# Patient Record
Sex: FEMALE | Race: WHITE | NOT HISPANIC OR LATINO | Employment: UNEMPLOYED | URBAN - METROPOLITAN AREA
[De-identification: names, ages, dates, MRNs, and addresses within clinical notes are randomized per-mention and may not be internally consistent; named-entity substitution may affect disease eponyms.]

---
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
## 2021-11-23 NOTE — ED NOTES
ABD soft and non-distended, RLQ tenderness with rebound tenderness noted.     Efren Zacarias RN  01/04/20 3058     Pt comes in with complaints of sore throat since last night, endorses feeling like it's \"razor blades\" when she swallows.

## 2021-12-06 ENCOUNTER — TELEPHONE (OUTPATIENT)
Dept: SURGERY | Facility: CLINIC | Age: 21
End: 2021-12-06

## 2021-12-06 NOTE — TELEPHONE ENCOUNTER
Caller: MATTHEW SUAREZ    Relationship to patient: SELF    Best call back number: [phone]    Patient is needing: PATIENT IS BEING REFERRED FOR BREAST DX. SHE STATES SHE IS IN A LOT OF PAIN AND WILL HAVE REFERRING PROVIDER FAX OVER HER LAST PROGRESS NOTE.

## 2021-12-06 NOTE — TELEPHONE ENCOUNTER
I called pt and she has scheduled an appt with Dr Billingsley for Mon 12/13/21.  Pt has had NO imaging but stated she will be calling her PCP today to see if she can get some imaging prior to appt.  Pt informed to keep us posted on this so we know where we need to get results from.  Pt verb she would do so.

## 2021-12-09 NOTE — PROGRESS NOTES
SURGERY  Sepideh Cook   2000  12/15/21    Chief Complaint:  Breast mass    HPI    Ms. Cook is a nice 21 y.o. female who is referred by SANTHOSH Sarmiento/Juhi Nuñez, with a right breast mass, 12:00.  This was found at  when she presented due to the brother having tested + for COVID and she having fever 103, vomiting, myalgias starting 12/1.  Her COVID test was negative 12/3.   She was started on keflex.    US Friday showing a fluid collection, 6 mm, consistent with possibly an abscess.  She believes that the area that has decreased as far as swelling pain and redness, but says the central knot is still about the same.  The patient actually thinks it may have gotten larger since her ultrasound.  She does not smoke and has not had any nipple piercings.    She is accompanied by her adoptive mother and father.  They are all understandably very anxious about the idea of a breast mass and/or aspiration.  She has some pretty significant anxiety issues it appears, and significant tachycardia associated with sepsis and thus they are very concerned about the potential for her needing any sort of intervention.  The patient's pain is not unbearable so we do not have to proceed with anything at this time.  The ultrasound in fact suggest a follow-up imaging in 6 weeks.    She is not had any fever other than that associated with her vomiting remotely, and nothing to suggest sepsis.    There is considerable confusion about her medications today and have asked that that they simply get that cleared up for our records.    Past Medical History:   Diagnosis Date   • Acid reflux    • Asthma    • Headache      Past Surgical History:   Procedure Laterality Date   • COLONOSCOPY N/A 06/19/2021   • WISDOM TOOTH EXTRACTION       Family History   Family history unknown: Yes     Social History     Socioeconomic History   • Marital status: Single   Tobacco Use   • Smoking status: Never Smoker   • Smokeless tobacco: Never Used   •  "Tobacco comment: Some passive smoke exposure. Denies vaping.   Vaping Use   • Vaping Use: Never used   Substance and Sexual Activity   • Alcohol use: Never   • Drug use: Never   • Sexual activity: Never         Current Outpatient Medications:   •  albuterol (PROVENTIL) (2.5 MG/3ML) 0.083% nebulizer solution, Take 2.5 mg by nebulization Every 4 (Four) Hours As Needed for Wheezing., Disp: , Rfl:   •  ARIPiprazole (ABILIFY) 2 MG tablet, Take 2 mg by mouth Daily., Disp: , Rfl:   •  cyclobenzaprine (FLEXERIL) 5 MG tablet, Take 5 mg by mouth 2 (Two) Times a Day As Needed., Disp: , Rfl:   •  digoxin (LANOXIN) 250 MCG tablet, Take 250 mcg by mouth Daily., Disp: , Rfl:   •  esomeprazole (nexIUM) 40 MG capsule, Take 1 capsule by mouth Daily., Disp: , Rfl:   •  fludrocortisone 0.1 MG tablet, Take 1 tablet by mouth Daily., Disp: , Rfl:   •  levonorgestrel-ethinyl estradiol (AVIANE,ALESSE,LESSINA) 0.1-20 MG-MCG per tablet, Take 1 tablet by mouth Daily., Disp: , Rfl:   •  LORazepam (ATIVAN) 0.5 MG tablet, Take 0.5 mg by mouth 2 (Two) Times a Day., Disp: , Rfl:   •  midodrine (PROAMATINE) 10 MG tablet, Take 10 mg by mouth 3 (Three) Times a Day., Disp: , Rfl:   •  promethazine (PHENERGAN) 25 MG tablet, Take 25 mg by mouth Every 6 (Six) Hours As Needed., Disp: , Rfl:   •  propranolol (INDERAL) 10 MG tablet, Take 1 tablet by mouth Daily., Disp: , Rfl:   •  Retin-A 0.025 % cream, Apply 1 application topically to the appropriate area as directed Every Night., Disp: , Rfl:   •  topiramate (TOPAMAX) 25 MG tablet, Take 50 mg by mouth Daily., Disp: , Rfl:     Allergies   Allergen Reactions   • Ivabradine Other (See Comments)     Nausea, vomitting. Weakness, breathing problems       Review of Systems  No fever, bloody discharge.  All other systems reviewed.   Vitals:    12/13/21 1518   Weight: 54.4 kg (120 lb)   Height: 154.9 cm (61\")       PHYSICAL EXAM:    Ht 154.9 cm (61\")   Wt 54.4 kg (120 lb)   LMP 11/17/2021   BMI 22.67 kg/m² "   Body mass index is 22.67 kg/m².    Constitutional: well developed, well nourished, appears stated age, healthy  Eyes: sclera nonicteric, conjunctiva not injected   ENMT: Hearing intact, trachea midline, dentitia not seen  CVS: RRR, no murmur, peripheral edema not present  Respiratory: CTA, normal respiratory effort   Gastrointestinal: no hepatosplenomegaly, abdomen soft  Musculoskeletal: gait normal, muscle mass normal  Skin: warm and dry, no rashes visible.  Of good integrity  Neurological: awake and alert, seems to have reasonable capacity for understanding for medical decision making  Psychiatric: appears to have reasonable judgement   Lymphatics: no cervical adenopathy or axillary adenopathy.  Breasts: Right breast in the subareolar region with about a 1 cm mass palpable, no discharge at present (tiny amount of clear discharge previously), not particularly firm.  Left breast without any palpable masses    Radiographic Findings: Possible abscess    IMPRESSION:  · Subareolar abscess versus complex cyst.  I cannot review the films since they were done at [facility].  Albeit she has improved on Keflex  · No family history none with the patient being adopted  · Severe anxiety with multiple medications  · Severe tachycardia when anxiety is heightened making intervention with any sort of aspiration more difficult  · Multiple meds which makes me concerned about any additional medication administration as I normally would do for someone with anxiety for any intervention    PLAN:  · Course of clindamycin.  This may help hasten the resolution of this fluid collection and hopefully make it where we have to do no intervention  · ultrasound of breast in 6 weeks, right limited.  I wrote for this at the same facility since I think it would be easier for comparison, and she may be more familiar with the setting  · Should intervention be necessary, will recommend that they get a single dose or 2 from Dr. Nuñez for administration  the morning of the procedure.  Informed the family that I will send her a note today so she will be aware of the issues.    Ivelisse Billingsley MD  12/15/21  4:30 PM

## 2021-12-13 ENCOUNTER — OFFICE VISIT (OUTPATIENT)
Dept: SURGERY | Facility: CLINIC | Age: 21
End: 2021-12-13

## 2021-12-13 VITALS — HEIGHT: 61 IN | BODY MASS INDEX: 22.66 KG/M2 | WEIGHT: 120 LBS

## 2021-12-13 DIAGNOSIS — N63.10 BREAST MASS, RIGHT: Primary | ICD-10-CM

## 2021-12-13 PROCEDURE — 99203 OFFICE O/P NEW LOW 30 MIN: CPT | Performed by: SURGERY

## 2021-12-13 RX ORDER — TRETINOIN 0.025 %
1 CREAM (GRAM) TOPICAL NIGHTLY
COMMUNITY
Start: 2021-10-25

## 2021-12-16 RX ORDER — CLINDAMYCIN HYDROCHLORIDE 150 MG/1
150 CAPSULE ORAL 3 TIMES DAILY
Qty: 21 CAPSULE | Refills: 0 | Status: SHIPPED | OUTPATIENT
Start: 2021-12-16 | End: 2021-12-23

## 2021-12-28 RX ORDER — CLINDAMYCIN HYDROCHLORIDE 150 MG/1
300 CAPSULE ORAL 4 TIMES DAILY
Qty: 80 CAPSULE | Refills: 0 | Status: SHIPPED | OUTPATIENT
Start: 2021-12-28 | End: 2022-01-07

## 2022-01-13 DIAGNOSIS — N63.10 BREAST MASS, RIGHT: Primary | ICD-10-CM

## 2022-01-17 ENCOUNTER — TELEPHONE (OUTPATIENT)
Dept: SURGERY | Facility: CLINIC | Age: 22
End: 2022-01-17

## 2022-01-17 NOTE — TELEPHONE ENCOUNTER
Left voicemail advising pt of appt for ultrasound guided breast aspiration on 2/4/22 at 10:30 am at [facility] pt to arrive at 9:30 am  NPO after MN pt will need a  and no Asprin containing products 5 days prior. Left my number for pt to call me with any questions.

## 2022-02-04 ENCOUNTER — HOSPITAL ENCOUNTER (OUTPATIENT)
Dept: ULTRASOUND IMAGING | Facility: HOSPITAL | Age: 22
Discharge: HOME | End: 2022-02-04
Admitting: SURGERY

## 2022-02-04 VITALS
TEMPERATURE: 98.4 F | WEIGHT: 125 LBS | DIASTOLIC BLOOD PRESSURE: 85 MMHG | RESPIRATION RATE: 18 BRPM | HEIGHT: 62 IN | SYSTOLIC BLOOD PRESSURE: 141 MMHG | HEART RATE: 117 BPM | BODY MASS INDEX: 23 KG/M2 | OXYGEN SATURATION: 99 %

## 2022-02-04 DIAGNOSIS — N63.10 BREAST MASS, RIGHT: ICD-10-CM

## 2022-02-04 PROCEDURE — 76942 ECHO GUIDE FOR BIOPSY: CPT

## 2022-02-04 NOTE — NURSING NOTE
VSS. Denies pain at this time. Medical/surgical history and medications reviewed. No distress noted. Procedural education completed with patient's questions addressed.

## 2022-02-04 NOTE — NURSING NOTE
Procedure not performed. Area unable to be  visualized on ultrasound. Patient ambulatory to entrance A.

## 2022-02-28 ENCOUNTER — PATIENT ROUNDING (BHMG ONLY) (OUTPATIENT)
Dept: OBSTETRICS AND GYNECOLOGY | Facility: CLINIC | Age: 22
End: 2022-02-28

## 2022-02-28 ENCOUNTER — OFFICE VISIT (OUTPATIENT)
Dept: OBSTETRICS AND GYNECOLOGY | Facility: CLINIC | Age: 22
End: 2022-02-28

## 2022-02-28 VITALS
DIASTOLIC BLOOD PRESSURE: 80 MMHG | SYSTOLIC BLOOD PRESSURE: 122 MMHG | BODY MASS INDEX: 21.97 KG/M2 | HEIGHT: 63 IN | WEIGHT: 124 LBS

## 2022-02-28 DIAGNOSIS — N63.0 BREAST MASS IN FEMALE: ICD-10-CM

## 2022-02-28 DIAGNOSIS — G43.809 OTHER MIGRAINE WITHOUT STATUS MIGRAINOSUS, NOT INTRACTABLE: ICD-10-CM

## 2022-02-28 DIAGNOSIS — Z11.3 SCREEN FOR STD (SEXUALLY TRANSMITTED DISEASE): ICD-10-CM

## 2022-02-28 DIAGNOSIS — Z13.89 SCREENING FOR GENITOURINARY CONDITION: Primary | ICD-10-CM

## 2022-02-28 DIAGNOSIS — N92.0 MENORRHAGIA WITH REGULAR CYCLE: ICD-10-CM

## 2022-02-28 LAB
B-HCG UR QL: NEGATIVE
BILIRUB BLD-MCNC: NEGATIVE MG/DL
CLARITY, POC: CLEAR
COLOR UR: YELLOW
EXPIRATION DATE: NORMAL
GLUCOSE UR STRIP-MCNC: NEGATIVE MG/DL
INTERNAL NEGATIVE CONTROL: NEGATIVE
INTERNAL POSITIVE CONTROL: POSITIVE
KETONES UR QL: NEGATIVE
LEUKOCYTE EST, POC: NEGATIVE
Lab: NORMAL
NITRITE UR-MCNC: NEGATIVE MG/ML
PH UR: 5 [PH] (ref 5–8)
PROT UR STRIP-MCNC: NEGATIVE MG/DL
RBC # UR STRIP: NEGATIVE /UL
SP GR UR: 1.02 (ref 1–1.03)
UROBILINOGEN UR QL: NORMAL

## 2022-02-28 PROCEDURE — 99204 OFFICE O/P NEW MOD 45 MIN: CPT | Performed by: OBSTETRICS & GYNECOLOGY

## 2022-02-28 PROCEDURE — 81025 URINE PREGNANCY TEST: CPT | Performed by: OBSTETRICS & GYNECOLOGY

## 2022-02-28 RX ORDER — OMEPRAZOLE AND SODIUM BICARBONATE 40; 1100 MG/1; MG/1
1 CAPSULE ORAL
COMMUNITY
Start: 2022-01-20 | End: 2022-09-08

## 2022-02-28 RX ORDER — LORATADINE 10 MG/1
10 TABLET ORAL DAILY PRN
Status: ON HOLD | COMMUNITY
Start: 2022-01-10 | End: 2022-09-16

## 2022-02-28 RX ORDER — TOPIRAMATE 25 MG/1
50 TABLET ORAL
COMMUNITY
Start: 2022-02-14 | End: 2022-03-30 | Stop reason: SDUPTHER

## 2022-02-28 RX ORDER — SUCRALFATE 1 G/1
1 TABLET ORAL 4 TIMES DAILY
COMMUNITY
Start: 2022-02-15 | End: 2022-09-08

## 2022-02-28 RX ORDER — PROPRANOLOL HYDROCHLORIDE 10 MG/1
10 TABLET ORAL 3 TIMES DAILY
COMMUNITY
Start: 2022-02-14 | End: 2022-09-08

## 2022-02-28 NOTE — PROGRESS NOTES
A MY-CHART MESSAGE HAS BEEN SENT TO THE PATIENT FOR PATIENT ROUNDING WITH [facility]

## 2022-03-01 LAB
BASOPHILS # BLD AUTO: 0.1 X10E3/UL (ref 0–0.2)
BASOPHILS NFR BLD AUTO: 1 %
EOSINOPHIL # BLD AUTO: 0.1 X10E3/UL (ref 0–0.4)
EOSINOPHIL NFR BLD AUTO: 3 %
ERYTHROCYTE [DISTWIDTH] IN BLOOD BY AUTOMATED COUNT: 12 % (ref 11.7–15.4)
FERRITIN SERPL-MCNC: 44 NG/ML (ref 15–150)
HBV SURFACE AG SERPL QL IA: NEGATIVE
HCT VFR BLD AUTO: 42.6 % (ref 34–46.6)
HCV AB S/CO SERPL IA: 0.2 S/CO RATIO (ref 0–0.9)
HGB BLD-MCNC: 14.6 G/DL (ref 11.1–15.9)
HIV 1+2 AB+HIV1 P24 AG SERPL QL IA: NON REACTIVE
HSV1 IGG SER IA-ACNC: 47.1 INDEX (ref 0–0.9)
HSV2 IGG SER IA-ACNC: <0.91 INDEX (ref 0–0.9)
IMM GRANULOCYTES # BLD AUTO: 0 X10E3/UL (ref 0–0.1)
IMM GRANULOCYTES NFR BLD AUTO: 0 %
LYMPHOCYTES # BLD AUTO: 1.9 X10E3/UL (ref 0.7–3.1)
LYMPHOCYTES NFR BLD AUTO: 38 %
MCH RBC QN AUTO: 31.1 PG (ref 26.6–33)
MCHC RBC AUTO-ENTMCNC: 34.3 G/DL (ref 31.5–35.7)
MCV RBC AUTO: 91 FL (ref 79–97)
MONOCYTES # BLD AUTO: 0.4 X10E3/UL (ref 0.1–0.9)
MONOCYTES NFR BLD AUTO: 8 %
NEUTROPHILS # BLD AUTO: 2.5 X10E3/UL (ref 1.4–7)
NEUTROPHILS NFR BLD AUTO: 50 %
PLATELET # BLD AUTO: 266 X10E3/UL (ref 150–450)
RBC # BLD AUTO: 4.7 X10E6/UL (ref 3.77–5.28)
RPR SER QL: NON REACTIVE
TSH SERPL DL<=0.005 MIU/L-ACNC: 2.11 UIU/ML (ref 0.45–4.5)
WBC # BLD AUTO: 5 X10E3/UL (ref 3.4–10.8)

## 2022-03-01 NOTE — PROGRESS NOTES
PIP= normal blood count, iron level and thyroid. 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

## 2022-03-02 LAB
C TRACH RRNA SPEC QL NAA+PROBE: NEGATIVE
N GONORRHOEA RRNA SPEC QL NAA+PROBE: NEGATIVE
T VAGINALIS DNA SPEC QL NAA+PROBE: NEGATIVE

## 2022-03-10 ENCOUNTER — HOSPITAL ENCOUNTER (OUTPATIENT)
Dept: ULTRASOUND IMAGING | Facility: HOSPITAL | Age: 22
Discharge: HOME | End: 2022-03-10
Admitting: OBSTETRICS & GYNECOLOGY

## 2022-03-10 DIAGNOSIS — N63.0 BREAST MASS IN FEMALE: ICD-10-CM

## 2022-03-10 PROCEDURE — 76642 ULTRASOUND BREAST LIMITED: CPT

## 2022-03-30 ENCOUNTER — OFFICE VISIT (OUTPATIENT)
Dept: OBSTETRICS AND GYNECOLOGY | Facility: CLINIC | Age: 22
End: 2022-03-30

## 2022-03-30 VITALS
WEIGHT: 124 LBS | DIASTOLIC BLOOD PRESSURE: 72 MMHG | BODY MASS INDEX: 22.82 KG/M2 | SYSTOLIC BLOOD PRESSURE: 110 MMHG | HEIGHT: 62 IN

## 2022-03-30 DIAGNOSIS — N94.6 DYSMENORRHEA: ICD-10-CM

## 2022-03-30 DIAGNOSIS — G43.809 OTHER MIGRAINE WITHOUT STATUS MIGRAINOSUS, NOT INTRACTABLE: ICD-10-CM

## 2022-03-30 DIAGNOSIS — N92.0 MENORRHAGIA WITH REGULAR CYCLE: ICD-10-CM

## 2022-03-30 DIAGNOSIS — N63.0 BREAST MASS IN FEMALE: Primary | ICD-10-CM

## 2022-03-30 PROCEDURE — 99214 OFFICE O/P EST MOD 30 MIN: CPT | Performed by: OBSTETRICS & GYNECOLOGY

## 2022-03-30 RX ORDER — ACETAMINOPHEN AND CODEINE PHOSPHATE 120; 12 MG/5ML; MG/5ML
1 SOLUTION ORAL DAILY
Qty: 84 TABLET | Refills: 1 | Status: SHIPPED | OUTPATIENT
Start: 2022-03-30 | End: 2022-07-13 | Stop reason: ALTCHOICE

## 2022-03-30 RX ORDER — SUMATRIPTAN 50 MG/1
50 TABLET, FILM COATED ORAL ONCE AS NEEDED
COMMUNITY
Start: 2022-03-21 | End: 2022-10-20 | Stop reason: SDUPTHER

## 2022-03-30 RX ORDER — TOPIRAMATE 50 MG/1
50 TABLET, FILM COATED ORAL DAILY
Status: ON HOLD | COMMUNITY
Start: 2022-03-21 | End: 2022-09-16

## 2022-07-13 ENCOUNTER — OFFICE VISIT (OUTPATIENT)
Dept: OBSTETRICS AND GYNECOLOGY | Facility: CLINIC | Age: 22
End: 2022-07-13

## 2022-07-13 VITALS
SYSTOLIC BLOOD PRESSURE: 118 MMHG | HEIGHT: 62 IN | DIASTOLIC BLOOD PRESSURE: 74 MMHG | BODY MASS INDEX: 23.66 KG/M2 | WEIGHT: 128.6 LBS

## 2022-07-13 DIAGNOSIS — Z78.9 SELF-CATHETERIZES URINARY BLADDER: ICD-10-CM

## 2022-07-13 DIAGNOSIS — N94.6 DYSMENORRHEA: Primary | ICD-10-CM

## 2022-07-13 PROCEDURE — 99213 OFFICE O/P EST LOW 20 MIN: CPT | Performed by: OBSTETRICS & GYNECOLOGY

## 2022-07-13 RX ORDER — MECLIZINE HYDROCHLORIDE 25 MG/1
25 TABLET ORAL 3 TIMES DAILY PRN
COMMUNITY
Start: 2022-05-10

## 2022-07-13 RX ORDER — CETIRIZINE HYDROCHLORIDE 10 MG/1
TABLET ORAL
COMMUNITY
Start: 2022-07-07 | End: 2022-09-08

## 2022-07-13 RX ORDER — TAMSULOSIN HYDROCHLORIDE 0.4 MG/1
1 CAPSULE ORAL DAILY
COMMUNITY
Start: 2022-05-11 | End: 2023-03-18

## 2022-07-13 RX ORDER — LORAZEPAM 0.5 MG/1
0.5 TABLET ORAL
COMMUNITY
Start: 2022-06-09 | End: 2022-09-08 | Stop reason: SDUPTHER

## 2022-07-13 RX ORDER — FLUTICASONE PROPIONATE 50 MCG
SPRAY, SUSPENSION (ML) NASAL
COMMUNITY
Start: 2022-05-09 | End: 2022-09-08 | Stop reason: SDUPTHER

## 2022-07-13 RX ORDER — HYDROXYZINE HYDROCHLORIDE 25 MG/1
TABLET, FILM COATED ORAL
COMMUNITY
Start: 2022-07-07 | End: 2022-09-08

## 2022-07-13 RX ORDER — PROPRANOLOL HYDROCHLORIDE 10 MG/1
10 TABLET ORAL 3 TIMES DAILY
COMMUNITY
Start: 2022-05-11 | End: 2022-09-08 | Stop reason: SDUPTHER

## 2022-07-13 RX ORDER — PRAZOSIN HYDROCHLORIDE 1 MG/1
1 CAPSULE ORAL DAILY PRN
COMMUNITY
Start: 2022-06-13 | End: 2022-11-28 | Stop reason: ALTCHOICE

## 2022-07-13 RX ORDER — FLUTICASONE PROPIONATE 50 MCG
2 SPRAY, SUSPENSION (ML) NASAL DAILY
COMMUNITY
Start: 2022-04-15 | End: 2022-09-08 | Stop reason: SDUPTHER

## 2022-07-13 RX ORDER — THIAMINE HCL 100 MG
1 TABLET ORAL 3 TIMES DAILY
COMMUNITY
Start: 2022-05-20 | End: 2023-03-18

## 2022-07-13 RX ORDER — TOPIRAMATE 50 MG/1
50 TABLET, FILM COATED ORAL
COMMUNITY
Start: 2022-03-21 | End: 2022-09-08 | Stop reason: SDUPTHER

## 2022-07-13 RX ORDER — PLECANATIDE 3 MG/1
1 TABLET ORAL DAILY
COMMUNITY
Start: 2022-05-20 | End: 2022-09-08

## 2022-07-13 RX ORDER — PRAZOSIN HYDROCHLORIDE 1 MG/1
1 CAPSULE ORAL
COMMUNITY
Start: 2022-06-13 | End: 2022-09-08 | Stop reason: SDUPTHER

## 2022-07-13 RX ORDER — SUCRALFATE 1 G/1
1 TABLET ORAL 4 TIMES DAILY
COMMUNITY
Start: 2022-04-19 | End: 2022-09-08

## 2022-07-13 RX ORDER — OMEPRAZOLE 40 MG/1
40 CAPSULE, DELAYED RELEASE ORAL
COMMUNITY
Start: 2022-04-25 | End: 2022-09-08
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
## 2022-07-26 ENCOUNTER — PREP FOR SURGERY (OUTPATIENT)
Dept: OTHER | Facility: HOSPITAL | Age: 22
End: 2022-07-26

## 2022-07-26 DIAGNOSIS — N94.6 SEVERE DYSMENORRHEA: Primary | ICD-10-CM

## 2022-07-26 DIAGNOSIS — N93.8 DUB (DYSFUNCTIONAL UTERINE BLEEDING): ICD-10-CM

## 2022-07-26 RX ORDER — SODIUM CHLORIDE 9 MG/ML
40 INJECTION, SOLUTION INTRAVENOUS AS NEEDED
Status: CANCELLED | OUTPATIENT
Start: 2022-07-26

## 2022-07-26 RX ORDER — SODIUM CHLORIDE 0.9 % (FLUSH) 0.9 %
10 SYRINGE (ML) INJECTION AS NEEDED
Status: CANCELLED | OUTPATIENT
Start: 2022-07-26

## 2022-07-26 RX ORDER — SODIUM CHLORIDE 0.9 % (FLUSH) 0.9 %
10 SYRINGE (ML) INJECTION EVERY 12 HOURS SCHEDULED
Status: CANCELLED | OUTPATIENT
Start: 2022-07-26

## 2022-08-01 ENCOUNTER — TELEPHONE (OUTPATIENT)
Dept: OBSTETRICS AND GYNECOLOGY | Facility: CLINIC | Age: 22
End: 2022-08-01

## 2022-08-01 NOTE — TELEPHONE ENCOUNTER
Spoke with the patient and she had surgery today and doesn't feel comfortable having surgery tomorrow. I told her I would see what else we could do and get back with her.    Statement Selected

## 2022-08-01 NOTE — TELEPHONE ENCOUNTER
Daron- can we add her on tomorrow? I do not want her to wait if she feels the pills are causing her to feel unstable. She can stop her pills and we will move up her surgery date.   Melany Murray MD

## 2022-08-18 ENCOUNTER — TELEPHONE (OUTPATIENT)
Dept: OBSTETRICS AND GYNECOLOGY | Facility: CLINIC | Age: 22
End: 2022-08-18

## 2022-08-18 NOTE — TELEPHONE ENCOUNTER
Caller: Sepideh Cook    Relationship to patient: Self    Best call back number: [phone]    Patient is needing: PT CALLED TO SCHEDULE AN IUD INSERTION. I WAS UNABLE TO WARM TRANSFER. PT STATED SHE NEEDS A Friday APPT AND WILL GO TO EITHER LOCATION.      PLEASE CALL PT BACK [phone].

## 2022-08-19 PROBLEM — N93.8 DUB (DYSFUNCTIONAL UTERINE BLEEDING): Status: ACTIVE | Noted: 2022-08-19

## 2022-08-19 PROBLEM — N94.6 SEVERE DYSMENORRHEA: Status: ACTIVE | Noted: 2022-08-19

## 2022-09-06 ENCOUNTER — TELEPHONE (OUTPATIENT)
Dept: OBSTETRICS AND GYNECOLOGY | Facility: CLINIC | Age: 22
End: 2022-09-06

## 2022-09-06 NOTE — TELEPHONE ENCOUNTER
Good afternoon! Dr. Murray is going to be performing an exam under anesthesia and Kyleena IUD placement on [facility] 9/16/22. Anesthesia is requesting cardiac clearance. Do you need to see the patient in your office? I faxed a clearance form to your office as well. Thank you for your time!

## 2022-09-08 ENCOUNTER — PRE-ADMISSION TESTING (OUTPATIENT)
Dept: PREADMISSION TESTING | Facility: HOSPITAL | Age: 22
End: 2022-09-08

## 2022-09-08 ENCOUNTER — ANESTHESIA EVENT (OUTPATIENT)
Dept: PERIOP | Facility: HOSPITAL | Age: 22
End: 2022-09-08

## 2022-09-08 VITALS
WEIGHT: 126 LBS | BODY MASS INDEX: 23.19 KG/M2 | DIASTOLIC BLOOD PRESSURE: 72 MMHG | SYSTOLIC BLOOD PRESSURE: 102 MMHG | OXYGEN SATURATION: 96 % | HEIGHT: 62 IN | RESPIRATION RATE: 16 BRPM | HEART RATE: 129 BPM

## 2022-09-08 LAB
ANION GAP SERPL CALCULATED.3IONS-SCNC: 8.3 MMOL/L (ref 5–15)
BUN SERPL-MCNC: 9 MG/DL (ref 6–20)
BUN/CREAT SERPL: 12.3 (ref 7–25)
CALCIUM SPEC-SCNC: 8.7 MG/DL (ref 8.6–10.5)
CHLORIDE SERPL-SCNC: 105 MMOL/L (ref 98–107)
CO2 SERPL-SCNC: 24.7 MMOL/L (ref 22–29)
CREAT SERPL-MCNC: 0.73 MG/DL (ref 0.57–1)
DEPRECATED RDW RBC AUTO: 40.9 FL (ref 37–54)
EGFRCR SERPLBLD CKD-EPI 2021: 120.9 ML/MIN/1.73
ERYTHROCYTE [DISTWIDTH] IN BLOOD BY AUTOMATED COUNT: 11.7 % (ref 12.3–15.4)
GLUCOSE SERPL-MCNC: 128 MG/DL (ref 65–99)
HCT VFR BLD AUTO: 40.3 % (ref 34–46.6)
HGB BLD-MCNC: 13.4 G/DL (ref 12–15.9)
MCH RBC QN AUTO: 30.9 PG (ref 26.6–33)
MCHC RBC AUTO-ENTMCNC: 33.3 G/DL (ref 31.5–35.7)
MCV RBC AUTO: 93.1 FL (ref 79–97)
PLATELET # BLD AUTO: 244 10*3/MM3 (ref 140–450)
PMV BLD AUTO: 9.2 FL (ref 6–12)
POTASSIUM SERPL-SCNC: 3.8 MMOL/L (ref 3.5–5.2)
RBC # BLD AUTO: 4.33 10*6/MM3 (ref 3.77–5.28)
SODIUM SERPL-SCNC: 138 MMOL/L (ref 136–145)
WBC NRBC COR # BLD: 4.91 10*3/MM3 (ref 3.4–10.8)

## 2022-09-08 PROCEDURE — 85027 COMPLETE CBC AUTOMATED: CPT | Performed by: OBSTETRICS & GYNECOLOGY

## 2022-09-08 PROCEDURE — 80048 BASIC METABOLIC PNL TOTAL CA: CPT | Performed by: OBSTETRICS & GYNECOLOGY

## 2022-09-08 PROCEDURE — 36415 COLL VENOUS BLD VENIPUNCTURE: CPT

## 2022-09-08 NOTE — DISCHARGE INSTRUCTIONS
PRE-ADMISSION TESTING INSTRUCTIONS FOR ADULTS    Take these medications the morning of surgery with a small sip of water:  use nebulizer, take multaq, abilify, nexium,, and ativan if needed      Do not take any insulin or diabetes medications the morning of surgery.      No aspirin, advil, aleve, ibuprofen, naproxen, diet pills, decongestants, or herbal/vitamins for a week prior to surgery.   Tylenol/Acetaminophen is okay to take if needed.    General Instructions:    DO NOT EAT SOLID FOOD AFTER MIDNIGHT THE NIGHT BEFORE SURGERY. No gum, mints, or hard candy after midnight the night before surgery.  You may drink clear liquids the day of surgery up until 2 hours before your arrival time. (5:30 am)  Clear liquids are liquids you can see through. Nothing RED in color.    Plain water    Sports drinks  Sodas     Gelatin (Jell-O)  Fruit juices without pulp such as white grape juice and apple juice  Popsicles that contain no fruit or yogurt  Tea or coffee (no cream or milk added)    It is beneficial for you to have a clear drink that contains carbohydrates just before you leave your house and before your fasting time begins.  We suggest a 20 ounce bottle of Gatorade or Powerade for non-diabetic patients or a 20 ounce bottle of G2 or Powerade Zero for diabetic patients.  (5:30 am)    Patients who avoid smoking, chewing tobacco and alcohol for 4 weeks prior to surgery have a reduced risk of post-operative complications.  If at all possible, quit smoking as many days before surgery as you can.    Do not smoke, use chewing tobacco or drink alcohol the day of surgery    Bring your C-PAP/ BI-PAP machine if you use one.  Wear clean comfortable clothes and socks.  Do not wear contact lenses, lotion, deodorant, or make-up.  Bring a case for your glasses if applicable. You may brush your teeth the morning of surgery.  You may wear dentures/partials, do not put adhesive/glue on them.  Leave all other jewelry and valuables at  home.      Preventing a Surgical Site Infection:    Shower the night before and on the morning of surgery using the chlorhexidine soap you were given.  Use a clean washcloth with the soap.  Place clean sheets on your bed after showering the night before surgery. Do not use the CHG soap on your hair, face, or private areas. Wash your body gently for five (5) minutes. Do not scrub your skin.  Dry with a clean towel and dress in clean clothing.  Do not shave the surgical area for 10 days-2 weeks prior to surgery  because the razor can irritate skin and make it easier to develop an infection.  Make sure you, your family, and all healthcare providers clean their hands with soap and water or an alcohol based hand  before caring for you or your wound.      Day of surgery:    Your surgeon’s office will advise you of your arrival time for the day of surgery.    Upon arrival, a Pre-op nurse and Anesthesia provider will review your health history, obtain vital signs, and answer questions you may have.  The only belongings needed at this time will be your home medications and if applicable your C-PAP/BI-PAP machine.  If you are staying overnight your family can leave the rest of your belongings in the car and bring them to your room later.  A Pre-op nurse will start an IV and you may receive medication in preparation for surgery, including something to help you relax.  Your family will be able to see you in the Pre-op area.  While you are in surgery your family should notify the waiting room  if they leave the waiting room area and provide a contact phone number.    IF you have any questions, you can call the Pre-Admission Department at [phone] or your surgeon's office.  Notify your surgeon if  you become sick, have a fever, productive cough, or cannot be here the day of surgery    Please be aware that surgery does come with discomfort.  We want to make every effort to control your discomfort so  please discuss any uncontrolled symptoms with your nurse.   Your doctor will most likely have prescribed pain medications.      If you are going home after surgery, you will receive individualized written care instructions before being discharged.  A responsible adult (over the age of 18) must drive you to and from the hospital on the day of your surgery and stay with you for 24 hours after anesthesia.    If you are staying overnight following surgery, you will be transported to your hospital room following the recovery period.  [facility] has all private rooms.    You may receive a survey regarding the care you received. Your feedback is very important and will be used to collect the necessary data to help us to continue to provide excellent care.     Deductibles and co-payments are collected on the day of service. Please be prepared to pay the required co-pay, deductible or deposit on the day of service as defined by your plan.

## 2022-09-08 NOTE — PAT
Pt here for PAT visit.  Pre-op tests completed, instructed shower w/antibacterial soap, and preop instructions reviewed.  Instructed clears until 5:30 am dos, voiced understanding. Surgoen's office has reached out to cardiology re: clearance for surgery. Will have anesthesia review for any further needs.

## 2022-09-12 ENCOUNTER — TELEPHONE (OUTPATIENT)
Dept: OBSTETRICS AND GYNECOLOGY | Facility: CLINIC | Age: 22
End: 2022-09-12

## 2022-09-12 NOTE — TELEPHONE ENCOUNTER
Caller: Sepideh Cook    Relationship to patient: Self    Best call back number: [phone]    Patient is needing:;LAB RESULTS

## 2022-09-13 ENCOUNTER — TELEPHONE (OUTPATIENT)
Dept: OBSTETRICS AND GYNECOLOGY | Facility: CLINIC | Age: 22
End: 2022-09-13

## 2022-09-13 NOTE — TELEPHONE ENCOUNTER
Patient wanting to know if her PAT labs were ok? She states her Glucose was a fasting glucose. Please advise

## 2022-09-13 NOTE — TELEPHONE ENCOUNTER
128 is high for a fasting sugar. She needs to see her primary care doctor for further workup. This does not change her gyn care at this time. TIFFANY

## 2022-09-15 PROCEDURE — S0260 H&P FOR SURGERY: HCPCS | Performed by: OBSTETRICS & GYNECOLOGY
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
## 2022-09-16 ENCOUNTER — ANESTHESIA (OUTPATIENT)
Dept: PERIOP | Facility: HOSPITAL | Age: 22
End: 2022-09-16

## 2022-09-16 ENCOUNTER — HOSPITAL ENCOUNTER (OUTPATIENT)
Facility: HOSPITAL | Age: 22
Setting detail: HOSPITAL OUTPATIENT SURGERY
Discharge: HOME | End: 2022-09-16
Attending: OBSTETRICS & GYNECOLOGY | Admitting: OBSTETRICS & GYNECOLOGY

## 2022-09-16 VITALS
OXYGEN SATURATION: 97 % | HEART RATE: 85 BPM | DIASTOLIC BLOOD PRESSURE: 72 MMHG | BODY MASS INDEX: 23.34 KG/M2 | SYSTOLIC BLOOD PRESSURE: 105 MMHG | WEIGHT: 127.6 LBS | RESPIRATION RATE: 15 BRPM | TEMPERATURE: 98.1 F

## 2022-09-16 DIAGNOSIS — N93.8 DUB (DYSFUNCTIONAL UTERINE BLEEDING): ICD-10-CM

## 2022-09-16 DIAGNOSIS — N94.6 SEVERE DYSMENORRHEA: ICD-10-CM

## 2022-09-16 DIAGNOSIS — Z30.430 ENCOUNTER FOR IUD INSERTION: Primary | ICD-10-CM

## 2022-09-16 LAB — HCG SERPL QL: NEGATIVE

## 2022-09-16 PROCEDURE — 25010000002 MIDAZOLAM PER 1MG: Performed by: NURSE ANESTHETIST, CERTIFIED REGISTERED

## 2022-09-16 PROCEDURE — 58300 INSERT INTRAUTERINE DEVICE: CPT | Performed by: OBSTETRICS & GYNECOLOGY

## 2022-09-16 PROCEDURE — 25010000002 FENTANYL CITRATE (PF) 100 MCG/2ML SOLUTION: Performed by: REGISTERED NURSE

## 2022-09-16 PROCEDURE — 25010000002 ONDANSETRON PER 1 MG: Performed by: NURSE ANESTHETIST, CERTIFIED REGISTERED

## 2022-09-16 PROCEDURE — C1889 IMPLANT/INSERT DEVICE, NOC: HCPCS | Performed by: OBSTETRICS & GYNECOLOGY

## 2022-09-16 PROCEDURE — 25010000002 DEXAMETHASONE PER 1 MG: Performed by: NURSE ANESTHETIST, CERTIFIED REGISTERED

## 2022-09-16 PROCEDURE — 84703 CHORIONIC GONADOTROPIN ASSAY: CPT | Performed by: NURSE ANESTHETIST, CERTIFIED REGISTERED

## 2022-09-16 PROCEDURE — 25010000002 PROPOFOL 10 MG/ML EMULSION: Performed by: REGISTERED NURSE

## 2022-09-16 DEVICE — IUD LEVONORGESTREL KYLEENA 19.5MG: Type: IMPLANTABLE DEVICE | Site: UTERUS | Status: FUNCTIONAL

## 2022-09-16 RX ORDER — FENTANYL CITRATE 50 UG/ML
25 INJECTION, SOLUTION INTRAMUSCULAR; INTRAVENOUS
Status: DISCONTINUED | OUTPATIENT
Start: 2022-09-16 | End: 2022-09-16 | Stop reason: HOSPADM

## 2022-09-16 RX ORDER — SODIUM CHLORIDE 0.9 % (FLUSH) 0.9 %
10 SYRINGE (ML) INJECTION EVERY 12 HOURS SCHEDULED
Status: DISCONTINUED | OUTPATIENT
Start: 2022-09-16 | End: 2022-09-16 | Stop reason: HOSPADM

## 2022-09-16 RX ORDER — LIDOCAINE HYDROCHLORIDE 20 MG/ML
INJECTION, SOLUTION INFILTRATION; PERINEURAL AS NEEDED
Status: DISCONTINUED | OUTPATIENT
Start: 2022-09-16 | End: 2022-09-16 | Stop reason: SURG

## 2022-09-16 RX ORDER — SODIUM CHLORIDE 9 MG/ML
40 INJECTION, SOLUTION INTRAVENOUS AS NEEDED
Status: DISCONTINUED | OUTPATIENT
Start: 2022-09-16 | End: 2022-09-16 | Stop reason: HOSPADM

## 2022-09-16 RX ORDER — MIDAZOLAM HYDROCHLORIDE 2 MG/2ML
0.5 INJECTION, SOLUTION INTRAMUSCULAR; INTRAVENOUS
Status: DISCONTINUED | OUTPATIENT
Start: 2022-09-16 | End: 2022-09-16 | Stop reason: HOSPADM

## 2022-09-16 RX ORDER — ONDANSETRON 2 MG/ML
4 INJECTION INTRAMUSCULAR; INTRAVENOUS ONCE AS NEEDED
Status: DISCONTINUED | OUTPATIENT
Start: 2022-09-16 | End: 2022-09-16 | Stop reason: HOSPADM

## 2022-09-16 RX ORDER — SODIUM CHLORIDE 0.9 % (FLUSH) 0.9 %
10 SYRINGE (ML) INJECTION AS NEEDED
Status: DISCONTINUED | OUTPATIENT
Start: 2022-09-16 | End: 2022-09-16 | Stop reason: HOSPADM

## 2022-09-16 RX ORDER — ONDANSETRON 2 MG/ML
4 INJECTION INTRAMUSCULAR; INTRAVENOUS ONCE AS NEEDED
Status: COMPLETED | OUTPATIENT
Start: 2022-09-16 | End: 2022-09-16

## 2022-09-16 RX ORDER — IBUPROFEN 600 MG/1
600 TABLET ORAL EVERY 6 HOURS PRN
Qty: 30 TABLET | Refills: 0 | Status: SHIPPED | OUTPATIENT
Start: 2022-09-16 | End: 2023-03-18

## 2022-09-16 RX ORDER — FAMOTIDINE 10 MG/ML
20 INJECTION, SOLUTION INTRAVENOUS
Status: COMPLETED | OUTPATIENT
Start: 2022-09-16 | End: 2022-09-16

## 2022-09-16 RX ORDER — DEXMEDETOMIDINE HYDROCHLORIDE 100 UG/ML
INJECTION, SOLUTION INTRAVENOUS AS NEEDED
Status: DISCONTINUED | OUTPATIENT
Start: 2022-09-16 | End: 2022-09-16 | Stop reason: SURG

## 2022-09-16 RX ORDER — KETAMINE HYDROCHLORIDE 10 MG/ML
INJECTION INTRAMUSCULAR; INTRAVENOUS AS NEEDED
Status: DISCONTINUED | OUTPATIENT
Start: 2022-09-16 | End: 2022-09-16 | Stop reason: SURG

## 2022-09-16 RX ORDER — HYDROCODONE BITARTRATE AND ACETAMINOPHEN 5; 325 MG/1; MG/1
1 TABLET ORAL EVERY 8 HOURS PRN
Qty: 4 TABLET | Refills: 0 | Status: SHIPPED | OUTPATIENT
Start: 2022-09-16 | End: 2022-09-28

## 2022-09-16 RX ORDER — FENTANYL CITRATE 50 UG/ML
INJECTION, SOLUTION INTRAMUSCULAR; INTRAVENOUS AS NEEDED
Status: DISCONTINUED | OUTPATIENT
Start: 2022-09-16 | End: 2022-09-16 | Stop reason: SURG

## 2022-09-16 RX ORDER — DEXAMETHASONE SODIUM PHOSPHATE 4 MG/ML
8 INJECTION, SOLUTION INTRA-ARTICULAR; INTRALESIONAL; INTRAMUSCULAR; INTRAVENOUS; SOFT TISSUE ONCE AS NEEDED
Status: COMPLETED | OUTPATIENT
Start: 2022-09-16 | End: 2022-09-16

## 2022-09-16 RX ORDER — GLYCOPYRROLATE 0.2 MG/ML
INJECTION INTRAMUSCULAR; INTRAVENOUS AS NEEDED
Status: DISCONTINUED | OUTPATIENT
Start: 2022-09-16 | End: 2022-09-16 | Stop reason: SURG

## 2022-09-16 RX ORDER — SODIUM CHLORIDE, SODIUM LACTATE, POTASSIUM CHLORIDE, CALCIUM CHLORIDE 600; 310; 30; 20 MG/100ML; MG/100ML; MG/100ML; MG/100ML
9 INJECTION, SOLUTION INTRAVENOUS CONTINUOUS PRN
Status: DISCONTINUED | OUTPATIENT
Start: 2022-09-16 | End: 2022-09-16 | Stop reason: HOSPADM

## 2022-09-16 RX ORDER — LIDOCAINE HYDROCHLORIDE 10 MG/ML
0.5 INJECTION, SOLUTION EPIDURAL; INFILTRATION; INTRACAUDAL; PERINEURAL ONCE AS NEEDED
Status: DISCONTINUED | OUTPATIENT
Start: 2022-09-16 | End: 2022-09-16 | Stop reason: HOSPADM

## 2022-09-16 RX ORDER — PROPOFOL 10 MG/ML
VIAL (ML) INTRAVENOUS AS NEEDED
Status: DISCONTINUED | OUTPATIENT
Start: 2022-09-16 | End: 2022-09-16 | Stop reason: SURG

## 2022-09-16 RX ORDER — MAGNESIUM HYDROXIDE 1200 MG/15ML
LIQUID ORAL AS NEEDED
Status: DISCONTINUED | OUTPATIENT
Start: 2022-09-16 | End: 2022-09-16 | Stop reason: HOSPADM

## 2022-09-16 RX ORDER — OXYCODONE AND ACETAMINOPHEN 7.5; 325 MG/1; MG/1
1 TABLET ORAL ONCE AS NEEDED
Status: DISCONTINUED | OUTPATIENT
Start: 2022-09-16 | End: 2022-09-16 | Stop reason: HOSPADM

## 2022-09-16 RX ORDER — SODIUM CHLORIDE, SODIUM LACTATE, POTASSIUM CHLORIDE, CALCIUM CHLORIDE 600; 310; 30; 20 MG/100ML; MG/100ML; MG/100ML; MG/100ML
100 INJECTION, SOLUTION INTRAVENOUS CONTINUOUS
Status: DISCONTINUED | OUTPATIENT
Start: 2022-09-16 | End: 2022-09-16 | Stop reason: HOSPADM

## 2022-09-16 RX ADMIN — LIDOCAINE HYDROCHLORIDE 100 MG: 20 INJECTION, SOLUTION INFILTRATION; PERINEURAL at 09:17

## 2022-09-16 RX ADMIN — DEXMEDETOMIDINE 12 MCG: 100 INJECTION, SOLUTION, CONCENTRATE INTRAVENOUS at 09:20

## 2022-09-16 RX ADMIN — PROPOFOL 125 MCG/KG/MIN: 10 INJECTION, EMULSION INTRAVENOUS at 09:18

## 2022-09-16 RX ADMIN — GLYCOPYRROLATE 0.1 MG: 0.2 INJECTION INTRAMUSCULAR; INTRAVENOUS at 09:17

## 2022-09-16 RX ADMIN — KETAMINE HYDROCHLORIDE 20 MG: 10 INJECTION INTRAMUSCULAR; INTRAVENOUS at 09:17

## 2022-09-16 RX ADMIN — SODIUM CHLORIDE, POTASSIUM CHLORIDE, SODIUM LACTATE AND CALCIUM CHLORIDE 9 ML/HR: 600; 310; 30; 20 INJECTION, SOLUTION INTRAVENOUS at 08:06

## 2022-09-16 RX ADMIN — PROPOFOL 100 MG: 10 INJECTION, EMULSION INTRAVENOUS at 09:17

## 2022-09-16 RX ADMIN — ONDANSETRON 4 MG: 2 INJECTION INTRAMUSCULAR; INTRAVENOUS at 08:06

## 2022-09-16 RX ADMIN — FAMOTIDINE 20 MG: 10 INJECTION INTRAVENOUS at 08:05

## 2022-09-16 RX ADMIN — FENTANYL CITRATE 25 MCG: 50 INJECTION, SOLUTION INTRAMUSCULAR; INTRAVENOUS at 09:17

## 2022-09-16 RX ADMIN — DEXAMETHASONE SODIUM PHOSPHATE 8 MG: 4 INJECTION, SOLUTION INTRAMUSCULAR; INTRAVENOUS at 08:06

## 2022-09-16 RX ADMIN — MIDAZOLAM HYDROCHLORIDE 0.5 MG: 1 INJECTION, SOLUTION INTRAMUSCULAR; INTRAVENOUS at 09:08

## 2022-09-16 NOTE — ANESTHESIA POSTPROCEDURE EVALUATION
Patient: Sepideh Cook    Procedure Summary     Date: 09/16/22 Room / Location: [facility] OR  /  [facility] OR    Anesthesia Start: 0912 Anesthesia Stop: 0942    Procedures:       EXAM UNDER ANESTHESIA, insertion of Kyleena intrauterine device (N/A )      INTRAUTERINE DEVICE INSERTION (N/A ) Diagnosis:       Severe dysmenorrhea      DUB (dysfunctional uterine bleeding)      (Severe dysmenorrhea [N94.6])      (DUB (dysfunctional uterine bleeding) [N93.8])    Surgeons: Melany Murray MD Provider: Nir Delgado CRNA    Anesthesia Type: MAC ASA Status: 3          Anesthesia Type: MAC    Vitals  Vitals Value Taken Time   /72 09/16/22 1030   Temp     Pulse 88 09/16/22 1030   Resp 12 09/16/22 1030   SpO2 97 % 09/16/22 1030           Post Anesthesia Care and Evaluation    Patient location during evaluation: bedside  Patient participation: complete - patient participated  Level of consciousness: awake and alert  Pain score: 0  Pain management: adequate    Airway patency: patent  Anesthetic complications: No anesthetic complications  PONV Status: none  Cardiovascular status: acceptable  Respiratory status: acceptable  Hydration status: acceptable

## 2022-09-16 NOTE — INTERVAL H&P NOTE
H&P reviewed. The patient was examined and there are no changes to the H&P.  /88   Pulse 113   Temp 98.1 °F (36.7 °C) (Oral)   Resp 18   Wt 57.9 kg (127 lb 9.6 oz)   LMP 09/09/2022   SpO2 98%   BMI 23.34 kg/m²   Procedure reviewed and all questions answered.    Melany Murray MD
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
## 2022-09-16 NOTE — ANESTHESIA PREPROCEDURE EVALUATION
Anesthesia Evaluation     Patient summary reviewed and Nursing notes reviewed   no history of anesthetic complications:  NPO Solid Status: > 8 hours  NPO Liquid Status: > 8 hours           Airway   Mallampati: II  TM distance: <3 FB  Neck ROM: full  No difficulty expected  Dental - normal exam     Comment: Braces top and bottom    Pulmonary - normal exam   (+) asthma,  (-) shortness of breath  Cardiovascular   Exercise tolerance: poor (<4 METS)    ECG reviewed    (+) dysrhythmias (svt),   (-) angina      Neuro/Psych  (+) headaches, syncope, psychiatric history Anxiety and Depression,    GI/Hepatic/Renal/Endo    (+)  GERD,      ROS Comment: Urinary retention issues - self caths    Musculoskeletal (-) negative ROS    Abdominal  - normal exam   Substance History   (-) alcohol use     OB/GYN negative ob/gyn ROS         Other        (-) history of cancer                Anesthesia Plan    ASA 3     MAC     (Mac to GA as tolerated)    Anesthetic plan, risks, benefits, and alternatives have been provided, discussed and informed consent has been obtained with: patient.    Use of blood products discussed with patient  Consented to blood products.       CODE STATUS:

## 2022-09-28 ENCOUNTER — OFFICE VISIT (OUTPATIENT)
Dept: OBSTETRICS AND GYNECOLOGY | Facility: CLINIC | Age: 22
End: 2022-09-28

## 2022-09-28 VITALS
SYSTOLIC BLOOD PRESSURE: 110 MMHG | HEIGHT: 62 IN | WEIGHT: 128.2 LBS | DIASTOLIC BLOOD PRESSURE: 78 MMHG | BODY MASS INDEX: 23.59 KG/M2

## 2022-09-28 DIAGNOSIS — Z13.9 SCREENING FOR CONDITION: Primary | ICD-10-CM

## 2022-09-28 DIAGNOSIS — R10.2 PELVIC CRAMPING: ICD-10-CM

## 2022-09-28 DIAGNOSIS — Z30.431 IUD CHECK UP: ICD-10-CM

## 2022-09-28 DIAGNOSIS — N83.202 CYST OF LEFT OVARY: ICD-10-CM

## 2022-09-28 PROCEDURE — 99214 OFFICE O/P EST MOD 30 MIN: CPT | Performed by: OBSTETRICS & GYNECOLOGY

## 2022-09-28 RX ORDER — SOTALOL HYDROCHLORIDE 80 MG/1
80 TABLET ORAL 2 TIMES DAILY
COMMUNITY
Start: 2022-08-23 | End: 2022-10-20 | Stop reason: SDUPTHER

## 2022-09-28 RX ORDER — FLECAINIDE ACETATE 50 MG/1
50 TABLET ORAL 2 TIMES DAILY
COMMUNITY
Start: 2022-08-09 | End: 2022-10-20 | Stop reason: SDUPTHER

## 2022-09-28 RX ORDER — ONDANSETRON 4 MG/1
TABLET, ORALLY DISINTEGRATING ORAL
COMMUNITY
Start: 2022-08-27 | End: 2022-10-20 | Stop reason: SDUPTHER

## 2022-09-28 RX ORDER — NADOLOL 40 MG/1
40 TABLET ORAL DAILY
COMMUNITY
Start: 2022-08-29 | End: 2022-10-20 | Stop reason: SDUPTHER

## 2022-09-28 RX ORDER — CLINDAMYCIN PHOSPHATE 10 MG/ML
SOLUTION TOPICAL
COMMUNITY
Start: 2022-08-03 | End: 2022-10-20 | Stop reason: SDUPTHER

## 2022-09-28 RX ORDER — CETIRIZINE HYDROCHLORIDE 10 MG/1
10 TABLET ORAL DAILY
COMMUNITY
Start: 2022-07-07 | End: 2022-10-20 | Stop reason: SDUPTHER

## 2022-09-28 RX ORDER — INDOMETHACIN 25 MG/1
CAPSULE ORAL
COMMUNITY
Start: 2022-08-06 | End: 2022-10-20 | Stop reason: SDUPTHER

## 2022-09-28 NOTE — PROGRESS NOTES
"      Sepideh Cook is a 21 y.o. patient who presents for follow up of   Chief Complaint   Patient presents with   • IUD CHECK     22 yo est pt here for Kyleena IUD check. She was unable to tolerate Aygestin due to mood symptoms. She had Kyleena placed in the OR on 9/16/2022. She has had pelvic cramping and some discharge since that time. She feels esquivel but no severe symptoms such as she had with oral progesterone. She declines a pelvic exam today. Her US shows a 6.6 cm AV uterus with the IUD in the correct position in the endometrium.  She does have a simple, sonolucent cyst on the left ovary that measures 2.6 x 2 x 1.9 cm.  Her ultrasound was compared to her last scan on 3/30/2022.  We discussed that if her pelvic pain worsens, she should return for repeat pelvic ultrasound in 2 months.        The following portions of the patient's history were reviewed and updated as appropriate: allergies, current medications and problem list.    Review of Systems   Constitutional: Positive for activity change and fatigue.   Genitourinary: Positive for menstrual problem, pelvic pain and vaginal discharge.   All other systems reviewed and are negative.      /78   Ht 157.5 cm (62\")   Wt 58.2 kg (128 lb 3.2 oz)   LMP 09/09/2022   BMI 23.45 kg/m²     Physical Exam  Vitals and nursing note reviewed.   Constitutional:       Appearance: Normal appearance. She is well-developed.   HENT:      Head: Normocephalic and atraumatic.   Eyes:      General: No scleral icterus.     Conjunctiva/sclera: Conjunctivae normal.   Neck:      Thyroid: No thyromegaly.   Abdominal:      General: There is no distension.      Palpations: Abdomen is soft. There is no mass.      Tenderness: There is no abdominal tenderness. There is no guarding or rebound.      Hernia: No hernia is present.   Skin:     General: Skin is warm and dry.   Neurological:      Mental Status: She is alert and oriented to person, place, and time.   Psychiatric:         " Mood and Affect: Mood normal.         Behavior: Behavior normal.         Thought Content: Thought content normal.         Judgment: Judgment normal.         A/P:  1. Pelvic cramping- etiology unclear. Normal pelvic US and IUD in place.   2. CS- Kyleena IUD placed 9/2022, in place on US. Will f/u in 6 months to recheck cycles  3. L ovarian cyst- small and appears functional. Pt to call back if pain worsens.  4. RTO 6 months f/u cycles  5. Time Spent: I spent > 30 minutes caring for Sepideh on this date of service. This time includes time spent by me in the following activities: preparing for the visit, reviewing tests, obtaining and/or reviewing a separately obtained history, performing a medically appropriate examination and/or evaluation, counseling and educating the patient/family/caregiver, ordering medications, tests, or procedures, documenting information in the medical record and independently interpreting results and communicating that information with the patient/family/caregiver.     Assessment & Plan   Diagnoses and all orders for this visit:    1. Screening for condition (Primary)    2. Pelvic cramping    3. IUD check up    4. Cyst of left ovary                 No follow-ups on file.      Melany Murray MD    9/28/2022  14:37 EDT

## 2022-10-20 ENCOUNTER — OFFICE VISIT (OUTPATIENT)
Dept: OBSTETRICS AND GYNECOLOGY | Facility: CLINIC | Age: 22
End: 2022-10-20

## 2022-10-20 VITALS
HEIGHT: 62 IN | BODY MASS INDEX: 22.82 KG/M2 | WEIGHT: 124 LBS | SYSTOLIC BLOOD PRESSURE: 110 MMHG | DIASTOLIC BLOOD PRESSURE: 62 MMHG

## 2022-10-20 DIAGNOSIS — N83.202 CYST OF LEFT OVARY: ICD-10-CM

## 2022-10-20 DIAGNOSIS — R10.2 PELVIC CRAMPING: Primary | ICD-10-CM

## 2022-10-20 DIAGNOSIS — Z30.431 IUD CHECK UP: ICD-10-CM

## 2022-10-20 PROCEDURE — 99214 OFFICE O/P EST MOD 30 MIN: CPT | Performed by: OBSTETRICS & GYNECOLOGY

## 2022-10-20 RX ORDER — ONDANSETRON 4 MG/1
TABLET, ORALLY DISINTEGRATING ORAL
COMMUNITY
Start: 2022-08-27

## 2022-10-20 RX ORDER — PROMETHAZINE HYDROCHLORIDE 25 MG/1
TABLET ORAL
COMMUNITY
Start: 2022-08-04 | End: 2022-11-28 | Stop reason: ALTCHOICE

## 2022-10-20 NOTE — PROGRESS NOTES
"Sepideh Joy is a 21 y.o. patient who presents for follow up of   Chief Complaint   Patient presents with   • Follow-up     U/S     20 yo est pt here for L sided back pain. She denies dysuria but she still has to self cath and they are not sure why. She had a Kyleena IUD in 9/16/2022. Her US today shows an 8.3 cm RV uterus with the IUD in the correct position. Her R ovary is normal. Her L ovary shows a simple cyst that measures 1.4 x 1.2 cm. There is no free fluid and her US was compared to her last scan on 9/28/2022.         The following portions of the patient's history were reviewed and updated as appropriate: allergies, current medications and problem list.    Review of Systems   Constitutional: Positive for activity change.   Genitourinary: Positive for difficulty urinating, flank pain and pelvic pain.   Musculoskeletal: Positive for back pain.   All other systems reviewed and are negative.      /62   Ht 157.5 cm (62\")   Wt 56.2 kg (124 lb)   BMI 22.68 kg/m²     Physical Exam  Vitals and nursing note reviewed.   Constitutional:       Appearance: Normal appearance. She is well-developed.   HENT:      Head: Normocephalic and atraumatic.   Eyes:      General: No scleral icterus.     Conjunctiva/sclera: Conjunctivae normal.   Neck:      Thyroid: No thyromegaly.   Abdominal:      General: Bowel sounds are normal. There is no distension.      Palpations: Abdomen is soft. There is no mass.      Tenderness: There is no abdominal tenderness. There is no guarding or rebound.      Hernia: No hernia is present.   Skin:     General: Skin is warm and dry.   Neurological:      Mental Status: She is alert and oriented to person, place, and time.   Psychiatric:         Mood and Affect: Mood normal.         Behavior: Behavior normal.         Thought Content: Thought content normal.         Judgment: Judgment normal.         A/P:  1. Pelvic and back pain- - check UA & CS. L ovarian cyst seen but is is very " small ( 1.4 cm) and I doubt it is the cause of her pain.   2. CS- IUD in place  3. RHM- RTO in 6 months f/u cycles.  4. Time Spent: I spent > 30 minutes caring for Sepideh on this date of service. This time includes time spent by me in the following activities: preparing for the visit, reviewing tests, obtaining and/or reviewing a separately obtained history, performing a medically appropriate examination and/or evaluation, counseling and educating the patient/family/caregiver, ordering medications, tests, or procedures, documenting information in the medical record and independently interpreting results and communicating that information with the patient/family/caregiver.       Assessment & Plan   Diagnoses and all orders for this visit:    1. Pelvic cramping (Primary)  -     Urine Culture - Urine, Urine, Random Void  -     Urinalysis With Microscopic - Urine, Random Void    2. Cyst of left ovary    3. IUD check up    Other orders  -     Microscopic Examination -                 No follow-ups on file.      Melany Murray MD    10/20/2022  16:39 EDT

## 2022-10-22 LAB
APPEARANCE UR: CLEAR
BACTERIA #/AREA URNS HPF: NORMAL /HPF
BACTERIA UR CULT: NO GROWTH
BACTERIA UR CULT: NORMAL
BILIRUB UR QL STRIP: NEGATIVE
CASTS URNS MICRO: NORMAL
COLOR UR: YELLOW
EPI CELLS #/AREA URNS HPF: NORMAL /HPF
GLUCOSE UR QL STRIP: NEGATIVE
HGB UR QL STRIP: ABNORMAL
KETONES UR QL STRIP: NEGATIVE
LEUKOCYTE ESTERASE UR QL STRIP: ABNORMAL
NITRITE UR QL STRIP: NEGATIVE
PH UR STRIP: 6 [PH] (ref 5–8)
PROT UR QL STRIP: NEGATIVE
RBC #/AREA URNS HPF: NORMAL /HPF
SP GR UR STRIP: 1.01 (ref 1–1.03)
UROBILINOGEN UR STRIP-MCNC: ABNORMAL MG/DL
WBC #/AREA URNS HPF: NORMAL /HPF

## 2022-11-28 ENCOUNTER — OFFICE VISIT (OUTPATIENT)
Dept: CARDIOLOGY | Facility: CLINIC | Age: 22
End: 2022-11-28

## 2022-11-28 VITALS
SYSTOLIC BLOOD PRESSURE: 100 MMHG | BODY MASS INDEX: 23.19 KG/M2 | HEIGHT: 62 IN | RESPIRATION RATE: 18 BRPM | OXYGEN SATURATION: 99 % | WEIGHT: 126 LBS | TEMPERATURE: 97.7 F | HEART RATE: 85 BPM | DIASTOLIC BLOOD PRESSURE: 66 MMHG

## 2022-11-28 DIAGNOSIS — G90.9 AUTONOMIC DYSFUNCTION: Primary | ICD-10-CM

## 2022-11-28 PROCEDURE — 99203 OFFICE O/P NEW LOW 30 MIN: CPT | Performed by: INTERNAL MEDICINE

## 2022-11-28 PROCEDURE — 93000 ELECTROCARDIOGRAM COMPLETE: CPT | Performed by: INTERNAL MEDICINE

## 2022-11-28 RX ORDER — FLUTICASONE PROPIONATE 50 MCG
2 SPRAY, SUSPENSION (ML) NASAL DAILY
COMMUNITY

## 2022-11-28 RX ORDER — DROXIDOPA 100 MG/1
100 CAPSULE ORAL 3 TIMES DAILY
Qty: 90 CAPSULE | Refills: 0 | Status: SHIPPED | OUTPATIENT
Start: 2022-11-28 | End: 2023-02-06

## 2022-11-28 NOTE — PROGRESS NOTES
[facility]  [facility] CARDIOLOGY  [address] [place] [address]  Dept: [phone]  Dept Fax: [phone]    Sepideh Joy  2000    New Patient Office Note    History of Present Illness:  Sepideh Joy is a 21 y.o. female who presents to the clinic for  Passing out, She is seen Dr Butler at , for syncope, inappropriate sinus tachycardia, SVT and recently ablations and also pacemaker implant , she was told she needs a autonomic dysfunction doctor and she was referred to the [facility] but her insurance did not approve it, and her mother have called [facility] and set up the appointment here, she has multiples episodes of feeling hot, dizzy, , she claims does not tolerates midodrine and has had also problems with beta blockers, ,last time she passed out was yesterday felt dizzy and few seconds later was out, she was sitting, her BP is 125.86 and ,sitting 120.66. Hr 85 no complaints, alter she stood up and BP at 3 minutes was 104.68., HR 97, but she was not able to tolerate more and got dizzy, , her EKG is sinus with pace atrial rhythm. Her cardiac exam is normal, , they also talk about her legs getting purple , but not today, her pulses are normal, she is not smoker, no diabetes, I think will use Northera 100 tid, will refer her to [facility] and will discuss with Dr Butler about her care, , I have review records from  ablation, and pacer    The following portions of the patient's history were reviewed and updated as appropriate: allergies, current medications, past family history, past medical history, past social history, past surgical history and problem list.    Medications:  albuterol  cyclobenzaprine  Droxidopa capsule  fluticasone  ibuprofen  levonorgestrel intrauterine device  LORazepam  Magnesium tablet  meclizine  ondansetron ODT  Retin-A cream  tamsulosin capsule    Subjective  Allergies   Allergen Reactions   • Digoxin Other (See  Comments)     Affects vision at night      • Ivabradine Other (See Comments)     Nausea, vomitting. Weakness, breathing problems        Past Medical History:   Diagnosis Date   • Acid reflux    • Anxiety    • Asthma    • Chest pain     w/tachycardia, hr up to 200s   • Depression    • Headache    • Migraine    • SVT (supraventricular tachycardia) (HCC)     inappropriate sinus tach at times, Dr Silva () cardiology   • Syncope     multiple d/t elevated HR and low BP   • Trauma    • Unable to pass urine     follows w/urology  [facility]       Past Surgical History:   Procedure Laterality Date   • CARDIAC ELECTROPHYSIOLOGY STUDY AND ABLATION      x2, last 8/1/2022   • COLONOSCOPY N/A 06/19/2021   • GYNECOLOGY EXAM UNDER ANESTHESIA N/A 9/16/2022    Procedure: GYNECOLOGY EXAM UNDER ANESTHESIA;  Surgeon: Melany Murray MD;  Location: [facility] OR;  Service: Obstetrics/Gynecology;  Laterality: N/A;   • INTRAUTERINE DEVICE INSERTION N/A 9/16/2022    Procedure: INTRAUTERINE DEVICE INSERTION;  Surgeon: Melany Murray MD;  Location: [facility] OR;  Service: Obstetrics/Gynecology;  Laterality: N/A;   • US GUIDED CYST ASPIRATION BREAST N/A 02/04/2022   • WISDOM TOOTH EXTRACTION         Family History   Adopted: Yes   Problem Relation Age of Onset   • Malig Hyperthermia Neg Hx         Social History     Socioeconomic History   • Marital status: Single   Tobacco Use   • Smoking status: Never   • Smokeless tobacco: Never   • Tobacco comments:     Some passive smoke exposure. Denies vaping.   Vaping Use   • Vaping Use: Never used   Substance and Sexual Activity   • Alcohol use: Never   • Drug use: Never   • Sexual activity: Never     Birth control/protection: I.U.D., Abstinence     Comment: Kyleena 9/2022       Review of Systems   Constitutional: Negative.    HENT: Negative.    Respiratory: Negative.    Cardiovascular: Negative.    Endocrine: Negative.    Genitourinary: Negative.    Musculoskeletal: Negative.   "  Skin: Negative.    Allergic/Immunologic: Negative.    Neurological: Negative.    Hematological: Negative.    Psychiatric/Behavioral: Negative.        Cardiovascular Procedures    ECHO/MUGA:   STRESS TESTS:   CARDIAC CATH:   DEVICES:   HOLTER:   CT/MRI:   VASCULAR:   CARDIOTHORACIC:     Objective  Vitals:    11/28/22 0905   BP: 100/66   BP Location: Right arm   Patient Position: Sitting   Cuff Size: Adult   Pulse: 85   Resp: 18   Temp: 97.7 °F (36.5 °C)   TempSrc: Infrared   SpO2: 99%   Weight: 57.2 kg (126 lb)   Height: 157.5 cm (62\")   PainSc: 0-No pain       Physical Exam  Constitutional:       Appearance: Healthy appearance. Not in distress.   Neck:      Vascular: No JVR. JVD normal.   Pulmonary:      Effort: Pulmonary effort is normal.      Breath sounds: Normal breath sounds. No wheezing. No rhonchi. No rales.   Chest:      Chest wall: Not tender to palpatation.   Cardiovascular:      PMI at left midclavicular line. Normal rate. Regular rhythm. Normal S1. Normal S2.      Murmurs: There is no murmur.      No gallop. No click. No rub.   Pulses:     Intact distal pulses.   Edema:     Peripheral edema absent.   Abdominal:      General: Bowel sounds are normal.      Palpations: Abdomen is soft.      Tenderness: There is no abdominal tenderness.   Musculoskeletal: Normal range of motion.         General: No tenderness. Skin:     General: Skin is warm and dry.   Neurological:      General: No focal deficit present.      Mental Status: Alert and oriented to person, place and time.          Diagnostic Data    ECG 12 Lead    Date/Time: 11/28/2022 9:58 AM  Performed by: Jay Mckeon MD  Authorized by: Jay Mckeon MD   Comparison: compared with previous ECG from 8/26/2022  Comparison to previous ECG: Pacemaker is new finding  Rhythm: sinus rhythm and paced  BPM: 85    Clinical impression: normal ECG            Assessment and Plan  Diagnoses and all orders for this visit:    Autonomic dysfunction- " Will start Northera 100 mg tid, will refer to autonomic clinic [facility]    Other orders  -     fluticasone (FLONASE) 50 MCG/ACT nasal spray; 2 sprays into the nostril(s) as directed by provider Daily.  -     Droxidopa (Northera) 100 MG capsule; Take 1 capsule by mouth 3 (Three) Times a Day.        No follow-ups on file.    Jay Mckeon MD  11/28/2022

## 2022-12-09 ENCOUNTER — PATIENT ROUNDING (BHMG ONLY) (OUTPATIENT)
Dept: CARDIOLOGY | Facility: CLINIC | Age: 22
End: 2022-12-09

## 2022-12-09 NOTE — PROGRESS NOTES
December 9, 2022    Hello, may I speak with Sepideh Joy?    My name is joie      I am  with [facility]  [facility] CARDIOLOGY  [address] [place] [address].    Before we get started may I verify your date of birth? 2000    I am calling to officially welcome you to our practice and ask about your recent visit. Is this a good time to talk? yes    Tell me about your visit with us. What things went well?  pretty good        We're always looking for ways to make our patients' experiences even better. Do you have recommendations on ways we may improve?  no    Overall were you satisfied with your first visit to our practice? yes       I appreciate you taking the time to speak with me today. Is there anything else I can do for you? no      Thank you, and have a great day.

## 2023-01-03 ENCOUNTER — TELEPHONE (OUTPATIENT)
Dept: CARDIOLOGY | Facility: CLINIC | Age: 23
End: 2023-01-03
Payer: MEDICAID

## 2023-01-03 NOTE — TELEPHONE ENCOUNTER
Sent in 1 month of northerna 100 mg. [facility]  Sheyi A pharmacist .    , I think will use Northera 100 tid, will refer her to [facility] and will discuss with Dr Butlre about her care, , I have review records from [place] ablation, and pacer. They will contact them for further refills

## 2023-01-12 ENCOUNTER — OFFICE VISIT (OUTPATIENT)
Dept: OBSTETRICS AND GYNECOLOGY | Facility: CLINIC | Age: 23
End: 2023-01-12
Payer: MEDICAID

## 2023-01-12 VITALS
HEIGHT: 62 IN | SYSTOLIC BLOOD PRESSURE: 122 MMHG | WEIGHT: 126 LBS | BODY MASS INDEX: 23.19 KG/M2 | DIASTOLIC BLOOD PRESSURE: 72 MMHG

## 2023-01-12 DIAGNOSIS — R31.9 HEMATURIA, UNSPECIFIED TYPE: ICD-10-CM

## 2023-01-12 DIAGNOSIS — N89.8 VAGINAL DISCHARGE: Primary | ICD-10-CM

## 2023-01-12 DIAGNOSIS — Z13.89 SCREENING FOR GENITOURINARY CONDITION: ICD-10-CM

## 2023-01-12 LAB
BILIRUB BLD-MCNC: NEGATIVE MG/DL
CLARITY, POC: CLEAR
COLOR UR: YELLOW
GLUCOSE UR STRIP-MCNC: NEGATIVE MG/DL
KETONES UR QL: NEGATIVE
LEUKOCYTE EST, POC: NEGATIVE
NITRITE UR-MCNC: NEGATIVE MG/ML
PH UR: 5 [PH] (ref 5–8)
PROT UR STRIP-MCNC: NEGATIVE MG/DL
RBC # UR STRIP: NEGATIVE /UL
SP GR UR: 1 (ref 1–1.03)
UROBILINOGEN UR QL: NORMAL

## 2023-01-12 PROCEDURE — 99213 OFFICE O/P EST LOW 20 MIN: CPT | Performed by: OBSTETRICS & GYNECOLOGY

## 2023-01-12 RX ORDER — PRAZOSIN HYDROCHLORIDE 1 MG/1
1 CAPSULE ORAL NIGHTLY
COMMUNITY
Start: 2022-12-06

## 2023-01-12 RX ORDER — PAROXETINE HYDROCHLORIDE 20 MG/1
20 TABLET, FILM COATED ORAL
COMMUNITY
Start: 2023-01-12 | End: 2023-02-11

## 2023-01-12 NOTE — PROGRESS NOTES
"      Sepideh Joy is a 22 y.o. patient who presents for follow up of   Chief Complaint   Patient presents with   • Vaginal Itching       21 yo est pt here for emergency appt for vaginal itching, burning and hematuria. She has a Kyleena IUD in place since 9/2022. She has to self cath and says she sometimes has blood in her urine and her urine looks cloudy as well. We discussed that we will check for UTIs but if her urine culture is normal then she will need to f/u with urology. They are wanting her to get a bladder pacemaker but cardiology wanted her to waita few months.  She is spotting today. She is not SA and is accompanied by her father. She found out her birth mother had cervical cancer and her MGM had breast cancer. We discussed that cervical cancer is not hereditary.       The following portions of the patient's history were reviewed and updated as appropriate: allergies, current medications and problem list.    Review of Systems   Constitutional: Positive for activity change and fatigue.   Genitourinary: Positive for difficulty urinating, hematuria, vaginal bleeding, vaginal discharge and vaginal pain.   Musculoskeletal: Positive for back pain.   Psychiatric/Behavioral: The patient is nervous/anxious.        /72   Ht 157.5 cm (62\")   Wt 57.2 kg (126 lb)   LMP  (LMP Unknown)   Breastfeeding No   BMI 23.05 kg/m²     Physical Exam  Vitals and nursing note reviewed. Exam conducted with a chaperone present.   Constitutional:       Appearance: Normal appearance. She is well-developed.   HENT:      Head: Normocephalic and atraumatic.   Eyes:      General: No scleral icterus.     Conjunctiva/sclera: Conjunctivae normal.   Neck:      Thyroid: No thyromegaly.   Abdominal:      General: There is no distension.      Palpations: Abdomen is soft. There is no mass.      Tenderness: There is no abdominal tenderness. There is no guarding or rebound.      Hernia: No hernia is present.   Genitourinary:     " General: Normal vulva.      Vagina: Bleeding present.      Cervix: No cervical motion tenderness.   Skin:     General: Skin is warm and dry.   Neurological:      Mental Status: She is alert and oriented to person, place, and time.   Psychiatric:         Mood and Affect: Mood is anxious.         Behavior: Behavior normal.         Thought Content: Thought content normal.         Judgment: Judgment normal.         A/P:  1. Vaginal discharge- check NuSwab Y/M/B/L  2. Hematuria and cloudy urine- check UA and CS.   3. CS- Kyleena IUD 9/2022  4. Gardasil vaccine in progress, had 2/3  5. RTO 4/2023 annual and last Gardasil shot.   6. Time Spent: I spent > 30 minutes caring for Sepideh on this date of service. This time includes time spent by me in the following activities: preparing for the visit, reviewing tests, obtaining and/or reviewing a separately obtained history, performing a medically appropriate examination and/or evaluation, counseling and educating the patient/family/caregiver, ordering medications, tests, or procedures, documenting information in the medical record and care coordination.       Assessment & Plan   Diagnoses and all orders for this visit:    1. Vaginal discharge (Primary)  -     NuSwab VG+ - Swab, Vagina  -     Candida panel, PCR - Swab, Vagina    2. Screening for genitourinary condition  -     POC Urinalysis Dipstick    3. Hematuria, unspecified type  -     Urine Culture - Urine, Urine, Random Void  -     Urinalysis With Microscopic - Urine, Random Void    Other orders  -     Microscopic Examination -                 No follow-ups on file.      Melany Murray MD    1/12/2023  15:02 EST

## 2023-01-14 LAB
APPEARANCE UR: CLEAR
BACTERIA #/AREA URNS HPF: ABNORMAL /HPF
BACTERIA UR CULT: NO GROWTH
BACTERIA UR CULT: NORMAL
BILIRUB UR QL STRIP: NEGATIVE
CASTS URNS MICRO: ABNORMAL
COLOR UR: YELLOW
EPI CELLS #/AREA URNS HPF: ABNORMAL /HPF
GLUCOSE UR QL STRIP: NEGATIVE
HGB UR QL STRIP: ABNORMAL
KETONES UR QL STRIP: NEGATIVE
LEUKOCYTE ESTERASE UR QL STRIP: ABNORMAL
NITRITE UR QL STRIP: NEGATIVE
PH UR STRIP: 8 [PH] (ref 5–8)
PROT UR QL STRIP: ABNORMAL
RBC #/AREA URNS HPF: ABNORMAL /HPF
SP GR UR STRIP: 1.01 (ref 1–1.03)
UROBILINOGEN UR STRIP-MCNC: ABNORMAL MG/DL
WBC #/AREA URNS HPF: ABNORMAL /HPF

## 2023-01-16 LAB
A VAGINAE DNA VAG QL NAA+PROBE: NORMAL SCORE
BVAB2 DNA VAG QL NAA+PROBE: NORMAL SCORE
C ALBICANS DNA VAG QL NAA+PROBE: NEGATIVE
C GLABRATA DNA VAG QL NAA+PROBE: NEGATIVE
C KRUSEI DNA VAG QL NAA+PROBE: NEGATIVE
C LUSITANIAE DNA VAG QL NAA+PROBE: NEGATIVE
C TRACH DNA VAG QL NAA+PROBE: NEGATIVE
CANDIDA DNA VAG QL NAA+PROBE: NEGATIVE
MEGA1 DNA VAG QL NAA+PROBE: NORMAL SCORE
N GONORRHOEA DNA VAG QL NAA+PROBE: NEGATIVE
T VAGINALIS DNA VAG QL NAA+PROBE: NEGATIVE

## 2023-02-06 RX ORDER — DROXIDOPA 100 MG/1
CAPSULE ORAL
Qty: 90 CAPSULE | Refills: 0 | Status: SHIPPED | OUTPATIENT
Start: 2023-02-06

## 2023-04-03 ENCOUNTER — OFFICE VISIT (OUTPATIENT)
Dept: OBSTETRICS AND GYNECOLOGY | Facility: CLINIC | Age: 23
End: 2023-04-03
Payer: MEDICAID

## 2023-04-03 VITALS
HEIGHT: 62 IN | BODY MASS INDEX: 22.97 KG/M2 | WEIGHT: 124.8 LBS | SYSTOLIC BLOOD PRESSURE: 118 MMHG | DIASTOLIC BLOOD PRESSURE: 72 MMHG

## 2023-04-03 DIAGNOSIS — Z01.419 PAP SMEAR, LOW-RISK: ICD-10-CM

## 2023-04-03 DIAGNOSIS — Z23 NEED FOR HPV VACCINATION: Primary | ICD-10-CM

## 2023-04-03 DIAGNOSIS — Z01.419 ROUTINE GYNECOLOGICAL EXAMINATION: ICD-10-CM

## 2023-04-03 DIAGNOSIS — Z11.51 ENCOUNTER FOR SCREENING FOR HUMAN PAPILLOMAVIRUS (HPV): ICD-10-CM

## 2023-04-03 DIAGNOSIS — Z30.431 IUD CHECK UP: ICD-10-CM

## 2023-04-03 LAB
B-HCG UR QL: NEGATIVE
BILIRUB BLD-MCNC: NEGATIVE MG/DL
CLARITY, POC: CLEAR
COLOR UR: YELLOW
EXPIRATION DATE: NORMAL
GLUCOSE UR STRIP-MCNC: NEGATIVE MG/DL
INTERNAL NEGATIVE CONTROL: NORMAL
INTERNAL POSITIVE CONTROL: NORMAL
KETONES UR QL: NEGATIVE
LEUKOCYTE EST, POC: NEGATIVE
Lab: NORMAL
NITRITE UR-MCNC: NEGATIVE MG/ML
PH UR: 5 [PH] (ref 5–8)
PROT UR STRIP-MCNC: NEGATIVE MG/DL
RBC # UR STRIP: NEGATIVE /UL
SP GR UR: 1 (ref 1–1.03)
UROBILINOGEN UR QL: NORMAL

## 2023-04-03 PROCEDURE — 1159F MED LIST DOCD IN RCRD: CPT | Performed by: OBSTETRICS & GYNECOLOGY

## 2023-04-03 PROCEDURE — 81002 URINALYSIS NONAUTO W/O SCOPE: CPT | Performed by: OBSTETRICS & GYNECOLOGY

## 2023-04-03 PROCEDURE — 1160F RVW MEDS BY RX/DR IN RCRD: CPT | Performed by: OBSTETRICS & GYNECOLOGY

## 2023-04-03 PROCEDURE — 81025 URINE PREGNANCY TEST: CPT | Performed by: OBSTETRICS & GYNECOLOGY

## 2023-04-03 PROCEDURE — 90471 IMMUNIZATION ADMIN: CPT | Performed by: OBSTETRICS & GYNECOLOGY

## 2023-04-03 PROCEDURE — 99395 PREV VISIT EST AGE 18-39: CPT | Performed by: OBSTETRICS & GYNECOLOGY

## 2023-04-03 PROCEDURE — 90651 9VHPV VACCINE 2/3 DOSE IM: CPT | Performed by: OBSTETRICS & GYNECOLOGY

## 2023-04-03 PROCEDURE — 2014F MENTAL STATUS ASSESS: CPT | Performed by: OBSTETRICS & GYNECOLOGY

## 2023-04-03 RX ORDER — ESOMEPRAZOLE MAGNESIUM 40 MG/1
40 CAPSULE, DELAYED RELEASE ORAL
COMMUNITY
Start: 2023-04-01

## 2023-04-03 RX ORDER — PRUCALOPRIDE 2 MG/1
1 TABLET, FILM COATED ORAL DAILY
Qty: 30 TABLET | Refills: 2 | COMMUNITY
Start: 2023-04-01 | End: 2023-06-30

## 2023-04-03 RX ORDER — BUPROPION HYDROCHLORIDE 150 MG/1
150 TABLET, EXTENDED RELEASE ORAL
COMMUNITY
Start: 2023-03-21 | End: 2023-09-17

## 2023-04-03 RX ORDER — FLUCONAZOLE 100 MG/1
TABLET ORAL
COMMUNITY
Start: 2023-03-27

## 2023-04-03 RX ORDER — TOPIRAMATE 50 MG/1
1 TABLET, FILM COATED ORAL 2 TIMES DAILY
COMMUNITY
Start: 2023-03-20
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
## 2023-04-07 LAB
C TRACH RRNA CVX QL NAA+PROBE: NEGATIVE
CONV .: NORMAL
CYTOLOGIST CVX/VAG CYTO: NORMAL
CYTOLOGY CVX/VAG DOC CYTO: NORMAL
CYTOLOGY CVX/VAG DOC THIN PREP: NORMAL
DX ICD CODE: NORMAL
HIV 1 & 2 AB SER-IMP: NORMAL
N GONORRHOEA RRNA CVX QL NAA+PROBE: NEGATIVE
OTHER STN SPEC: NORMAL
STAT OF ADQ CVX/VAG CYTO-IMP: NORMAL
T VAGINALIS RRNA SPEC QL NAA+PROBE: NEGATIVE

## 2023-04-25 ENCOUNTER — TELEPHONE (OUTPATIENT)
Dept: CARDIOLOGY | Facility: CLINIC | Age: 23
End: 2023-04-25
Payer: MEDICAID

## 2023-04-25 RX ORDER — DROXIDOPA 100 MG/1
1 CAPSULE ORAL 3 TIMES DAILY
Qty: 90 CAPSULE | Refills: 0 | Status: SHIPPED | OUTPATIENT
Start: 2023-04-25 | End: 2023-04-28 | Stop reason: SDUPTHER

## 2023-04-25 NOTE — TELEPHONE ENCOUNTER
Droxidopa 100 MG capsule      [facility]  SANTHOSH Stout - [address] - [phone]  - [phone] FX      Mail order

## 2023-04-28 NOTE — TELEPHONE ENCOUNTER
CAN YOU SEND THE DROXIDOPA 100MG TO     [id number] - [address] - [phone] [facility] [phone] FX        SHE IS OUT AND HAS BEEN PASSING OUT

## 2023-07-26 ENCOUNTER — TELEPHONE (OUTPATIENT)
Dept: URGENT CARE | Facility: CLINIC | Age: 23
End: 2023-07-26
Payer: MEDICAID

## 2023-07-26 DIAGNOSIS — N39.0 ACUTE UTI: Primary | ICD-10-CM

## 2023-07-26 RX ORDER — SULFAMETHOXAZOLE AND TRIMETHOPRIM 800; 160 MG/1; MG/1
1 TABLET ORAL 2 TIMES DAILY
Qty: 14 TABLET | Refills: 0 | Status: SHIPPED | OUTPATIENT
Start: 2023-07-26 | End: 2023-08-02

## 2023-07-26 NOTE — TELEPHONE ENCOUNTER
Please call patient and see if she is feeling better on nitrofurantoin.   Urine culture was positive for bacterial growth.    Nitrofurantoin showed intermediate susceptibility.    If she is feeling better- complete as prescribed, no further action needed.  If she is not feeling better- stop nitrofurantoin, start bactrim and take until complete.  It has already been sent to pharmacy in case she needs it.

## 2023-08-18 ENCOUNTER — HOSPITAL ENCOUNTER (OUTPATIENT)
Dept: INFUSION THERAPY | Facility: HOSPITAL | Age: 23
Discharge: HOME | End: 2023-08-18
Payer: MEDICAID

## 2023-08-18 VITALS
TEMPERATURE: 97.8 F | DIASTOLIC BLOOD PRESSURE: 72 MMHG | BODY MASS INDEX: 22.68 KG/M2 | SYSTOLIC BLOOD PRESSURE: 116 MMHG | OXYGEN SATURATION: 100 % | RESPIRATION RATE: 16 BRPM | HEART RATE: 85 BPM | WEIGHT: 124 LBS

## 2023-08-18 DIAGNOSIS — N39.0 URINARY TRACT INFECTION WITHOUT HEMATURIA, SITE UNSPECIFIED: Primary | ICD-10-CM

## 2023-08-18 PROCEDURE — 96365 THER/PROPH/DIAG IV INF INIT: CPT

## 2023-08-18 PROCEDURE — 25010000002 CEFTRIAXONE SODIUM-DEXTROSE 1000-3.74 MG-%(50ML) RECONSTITUTED SOLUTION: Performed by: NURSE PRACTITIONER

## 2023-08-18 PROCEDURE — C1751 CATH, INF, PER/CENT/MIDLINE: HCPCS

## 2023-08-18 PROCEDURE — 36410 VNPNXR 3YR/> PHY/QHP DX/THER: CPT

## 2023-08-18 RX ORDER — CEFTRIAXONE 1 G/50ML
1000 INJECTION, SOLUTION INTRAVENOUS EVERY 24 HOURS
Status: COMPLETED | OUTPATIENT
Start: 2023-08-18 | End: 2023-08-18

## 2023-08-18 RX ORDER — CEFTRIAXONE 1 G/50ML
1000 INJECTION, SOLUTION INTRAVENOUS EVERY 24 HOURS
Status: CANCELLED | OUTPATIENT
Start: 2023-08-19

## 2023-08-18 RX ADMIN — CEFTRIAXONE 1000 MG: 1 INJECTION, SOLUTION INTRAVENOUS at 16:09

## 2023-08-18 NOTE — NURSING NOTE
Patient arrived to [facility] at 1500 for scheduled Midline Cath placement and Rocephin IV.  History and medications reviewed.  Procedure explained in detail with patient and her father.  Verbalized understanding.  Procedure started at 1540 per protocol. A 3FR single lumen Power Midline was placed in right upper arm using ultrasound and modified seldinger technique.  Patient tolerated procedure well.  Infusion of rocephin completed at 1640.  Midline flushed per policy.  Patient and her father were instructed regarding the care of the midline at home.  Importance of maintaining a C/D/I sterile dressing at all times.. AVS reviewed and a copy provided along with additional appointment dates and times.  Escorted to [facility] per W/C at 1700 and discharged home without c/o. D.OMARI Arzola

## 2023-08-18 NOTE — PATIENT INSTRUCTIONS
"  Call  MONSERRAT RIVERA, APRN [phone]  if you have any problems or concerns.    We know you have a Choice in healthcare and appreciate you using [facility].  Our purpose is to provide you \"Excellent Care\".  We hope that you will always choose us in the future and continue to recommend us to your family and friends.             "

## 2023-08-19 ENCOUNTER — HOSPITAL ENCOUNTER (OUTPATIENT)
Dept: INFUSION THERAPY | Facility: HOSPITAL | Age: 23
Discharge: HOME | End: 2023-08-19
Payer: MEDICAID

## 2023-08-19 VITALS
TEMPERATURE: 98.4 F | WEIGHT: 126 LBS | HEIGHT: 62 IN | SYSTOLIC BLOOD PRESSURE: 109 MMHG | BODY MASS INDEX: 23.19 KG/M2 | HEART RATE: 84 BPM | RESPIRATION RATE: 16 BRPM | DIASTOLIC BLOOD PRESSURE: 66 MMHG | OXYGEN SATURATION: 100 %

## 2023-08-19 DIAGNOSIS — N39.0 URINARY TRACT INFECTION WITHOUT HEMATURIA, SITE UNSPECIFIED: Primary | ICD-10-CM

## 2023-08-19 PROCEDURE — 96374 THER/PROPH/DIAG INJ IV PUSH: CPT

## 2023-08-19 PROCEDURE — 25010000002 CEFTRIAXONE SODIUM-DEXTROSE 1000-3.74 MG-%(50ML) RECONSTITUTED SOLUTION: Performed by: NURSE PRACTITIONER

## 2023-08-19 RX ORDER — CEFTRIAXONE 1 G/50ML
1000 INJECTION, SOLUTION INTRAVENOUS EVERY 24 HOURS
Status: CANCELLED | OUTPATIENT
Start: 2023-08-20

## 2023-08-19 RX ORDER — CEFTRIAXONE 1 G/50ML
1000 INJECTION, SOLUTION INTRAVENOUS EVERY 24 HOURS
Status: COMPLETED | OUTPATIENT
Start: 2023-08-19 | End: 2023-08-19

## 2023-08-19 RX ADMIN — CEFTRIAXONE 1000 MG: 1 INJECTION, SOLUTION INTRAVENOUS at 15:24

## 2023-08-19 NOTE — NURSING NOTE
Pt arrives per wheelchair to 1406 for outpatient infusion.  Midline intact in Right AC.  Given Rocephin infusion as ordered. To lobby on discharge.  Accompanied by father.

## 2023-08-20 ENCOUNTER — HOSPITAL ENCOUNTER (OUTPATIENT)
Dept: INFUSION THERAPY | Facility: HOSPITAL | Age: 23
Discharge: HOME | End: 2023-08-20
Admitting: NURSE PRACTITIONER
Payer: MEDICAID

## 2023-08-20 VITALS
RESPIRATION RATE: 18 BRPM | HEART RATE: 85 BPM | DIASTOLIC BLOOD PRESSURE: 73 MMHG | OXYGEN SATURATION: 99 % | TEMPERATURE: 98.5 F | SYSTOLIC BLOOD PRESSURE: 116 MMHG

## 2023-08-20 DIAGNOSIS — N39.0 URINARY TRACT INFECTION WITHOUT HEMATURIA, SITE UNSPECIFIED: Primary | ICD-10-CM

## 2023-08-20 PROCEDURE — 25010000002 CEFTRIAXONE SODIUM-DEXTROSE 1000-3.74 MG-%(50ML) RECONSTITUTED SOLUTION: Performed by: NURSE PRACTITIONER

## 2023-08-20 PROCEDURE — 96365 THER/PROPH/DIAG IV INF INIT: CPT

## 2023-08-20 RX ORDER — CEFTRIAXONE 1 G/1
INJECTION, POWDER, FOR SOLUTION INTRAMUSCULAR; INTRAVENOUS
Status: DISCONTINUED
Start: 2023-08-20 | End: 2023-08-21 | Stop reason: HOSPADM

## 2023-08-20 RX ORDER — CEFTRIAXONE 1 G/50ML
1000 INJECTION, SOLUTION INTRAVENOUS EVERY 24 HOURS
Status: COMPLETED | OUTPATIENT
Start: 2023-08-20 | End: 2023-08-20

## 2023-08-20 RX ORDER — SODIUM CHLORIDE 9 MG/ML
INJECTION, SOLUTION INTRAVENOUS
Status: DISCONTINUED
Start: 2023-08-20 | End: 2023-08-21 | Stop reason: HOSPADM

## 2023-08-20 RX ORDER — CEFTRIAXONE 1 G/50ML
1000 INJECTION, SOLUTION INTRAVENOUS EVERY 24 HOURS
Status: CANCELLED | OUTPATIENT
Start: 2023-08-21

## 2023-08-20 RX ADMIN — CEFTRIAXONE 1000 MG: 1 INJECTION, SOLUTION INTRAVENOUS at 15:15

## 2023-08-20 NOTE — NURSING NOTE
Pt arrives per wheelchair to 1403 for outpatient Rocephin infusion.  Given per Right AC midline. Dsg intact. After infusion, to lobby on discharge. Dad accompanied pt.   Detail Level: Simple

## 2023-08-21 ENCOUNTER — HOSPITAL ENCOUNTER (OUTPATIENT)
Dept: INFUSION THERAPY | Facility: HOSPITAL | Age: 23
Discharge: HOME | End: 2023-08-21
Admitting: NURSE PRACTITIONER
Payer: MEDICAID

## 2023-08-21 VITALS — DIASTOLIC BLOOD PRESSURE: 72 MMHG | SYSTOLIC BLOOD PRESSURE: 116 MMHG | RESPIRATION RATE: 18 BRPM | TEMPERATURE: 97.6 F

## 2023-08-21 DIAGNOSIS — N39.0 URINARY TRACT INFECTION WITHOUT HEMATURIA, SITE UNSPECIFIED: Primary | ICD-10-CM

## 2023-08-21 PROCEDURE — 25010000002 CEFTRIAXONE SODIUM-DEXTROSE 1000-3.74 MG-%(50ML) RECONSTITUTED SOLUTION: Performed by: NURSE PRACTITIONER

## 2023-08-21 PROCEDURE — 96365 THER/PROPH/DIAG IV INF INIT: CPT

## 2023-08-21 RX ORDER — CEFTRIAXONE 1 G/50ML
1000 INJECTION, SOLUTION INTRAVENOUS EVERY 24 HOURS
Status: COMPLETED | OUTPATIENT
Start: 2023-08-21 | End: 2023-08-21

## 2023-08-21 RX ORDER — CEFTRIAXONE 1 G/50ML
1000 INJECTION, SOLUTION INTRAVENOUS EVERY 24 HOURS
Status: CANCELLED | OUTPATIENT
Start: 2023-08-22

## 2023-08-21 RX ADMIN — CEFTRIAXONE 1000 MG: 1 INJECTION, SOLUTION INTRAVENOUS at 15:17

## 2023-08-21 NOTE — NURSING NOTE
Pt arrived to [facility] for appt. VSS, no complaints at this time. Medication given per MD order. Pt tolerated well. Pt discharged from [facility] at 16:00 in stable condition, without complaints.

## 2023-08-21 NOTE — PATIENT INSTRUCTIONS
"  Call  SANTHOSH Thompson @ [phone]  if you have any problems or concerns.    We know you have a Choice in healthcare and appreciate you using [facility].  Our purpose is to provide you \"Excellent Care\".  We hope that you will always choose us in the future and continue to recommend us to your family and friends.              "

## 2023-08-22 ENCOUNTER — HOSPITAL ENCOUNTER (OUTPATIENT)
Dept: INFUSION THERAPY | Facility: HOSPITAL | Age: 23
Discharge: HOME | End: 2023-08-22
Admitting: NURSE PRACTITIONER
Payer: MEDICAID

## 2023-08-22 VITALS
SYSTOLIC BLOOD PRESSURE: 110 MMHG | HEART RATE: 87 BPM | OXYGEN SATURATION: 99 % | TEMPERATURE: 97.6 F | DIASTOLIC BLOOD PRESSURE: 68 MMHG | RESPIRATION RATE: 16 BRPM

## 2023-08-22 DIAGNOSIS — N39.0 URINARY TRACT INFECTION WITHOUT HEMATURIA, SITE UNSPECIFIED: Primary | ICD-10-CM

## 2023-08-22 PROCEDURE — 25010000002 CEFTRIAXONE SODIUM-DEXTROSE 1-3.74 GM-%(50ML) RECONSTITUTED SOLUTION: Performed by: NURSE PRACTITIONER

## 2023-08-22 PROCEDURE — 96365 THER/PROPH/DIAG IV INF INIT: CPT

## 2023-08-22 RX ORDER — CEFTRIAXONE 1 G/50ML
1000 INJECTION, SOLUTION INTRAVENOUS EVERY 24 HOURS
Status: CANCELLED | OUTPATIENT
Start: 2023-08-23

## 2023-08-22 RX ORDER — CEFTRIAXONE 1 G/50ML
1000 INJECTION, SOLUTION INTRAVENOUS EVERY 24 HOURS
Status: COMPLETED | OUTPATIENT
Start: 2023-08-22 | End: 2023-08-22

## 2023-08-22 RX ADMIN — CEFTRIAXONE 1000 MG: 1 INJECTION, SOLUTION INTRAVENOUS at 15:11

## 2023-08-22 NOTE — NURSING NOTE
NURSING PROGRESS NOTE:    PATIENT ARRIVE TO [facility], PER W/C,  FOR SCHEDULED INFUSION AT 1505 .  PROCEDURE WAS PERFORMED WITHOUT INCIDENT. ESCORTED TO LOBBY PER W/C AND PATIENT DISCHARGED HOME AT 1550 WITH HER FATHER. . OMARI ROCHE

## 2023-08-23 ENCOUNTER — HOSPITAL ENCOUNTER (OUTPATIENT)
Dept: INFUSION THERAPY | Facility: HOSPITAL | Age: 23
Discharge: HOME | End: 2023-08-23
Admitting: NURSE PRACTITIONER
Payer: MEDICAID

## 2023-08-23 VITALS
DIASTOLIC BLOOD PRESSURE: 79 MMHG | RESPIRATION RATE: 16 BRPM | HEART RATE: 81 BPM | SYSTOLIC BLOOD PRESSURE: 115 MMHG | TEMPERATURE: 97.5 F

## 2023-08-23 DIAGNOSIS — N39.0 URINARY TRACT INFECTION WITHOUT HEMATURIA, SITE UNSPECIFIED: Primary | ICD-10-CM

## 2023-08-23 PROCEDURE — 96365 THER/PROPH/DIAG IV INF INIT: CPT

## 2023-08-23 PROCEDURE — 25010000002 CEFTRIAXONE SODIUM-DEXTROSE 1-3.74 GM-%(50ML) RECONSTITUTED SOLUTION: Performed by: NURSE PRACTITIONER

## 2023-08-23 RX ORDER — CEFTRIAXONE 1 G/50ML
1000 INJECTION, SOLUTION INTRAVENOUS EVERY 24 HOURS
Status: CANCELLED | OUTPATIENT
Start: 2023-08-24

## 2023-08-23 RX ORDER — CEFTRIAXONE 1 G/50ML
1000 INJECTION, SOLUTION INTRAVENOUS EVERY 24 HOURS
Status: COMPLETED | OUTPATIENT
Start: 2023-08-23 | End: 2023-08-23

## 2023-08-23 RX ADMIN — CEFTRIAXONE 1000 MG: 1 INJECTION, SOLUTION INTRAVENOUS at 15:15

## 2023-08-23 NOTE — NURSING NOTE
1500  Pt here to [facility] per wheelchair with father for antibiotic infusion.  1553  Pt discharged per wheelchair and reminded of appt tomorrow.  Pt denies any adverse reactions to infusion today and VSS.  Midline IV catheter intact with no redness or swelling and good blood return.

## 2023-08-24 ENCOUNTER — HOSPITAL ENCOUNTER (OUTPATIENT)
Dept: INFUSION THERAPY | Facility: HOSPITAL | Age: 23
Discharge: HOME | End: 2023-08-24
Admitting: NURSE PRACTITIONER
Payer: MEDICAID

## 2023-08-24 VITALS
SYSTOLIC BLOOD PRESSURE: 114 MMHG | DIASTOLIC BLOOD PRESSURE: 77 MMHG | HEART RATE: 82 BPM | TEMPERATURE: 97.2 F | OXYGEN SATURATION: 99 % | RESPIRATION RATE: 16 BRPM

## 2023-08-24 DIAGNOSIS — N39.0 URINARY TRACT INFECTION WITHOUT HEMATURIA, SITE UNSPECIFIED: Primary | ICD-10-CM

## 2023-08-24 PROCEDURE — 96365 THER/PROPH/DIAG IV INF INIT: CPT

## 2023-08-24 PROCEDURE — 87086 URINE CULTURE/COLONY COUNT: CPT | Performed by: NURSE PRACTITIONER

## 2023-08-24 PROCEDURE — 25010000002 CEFTRIAXONE SODIUM-DEXTROSE 1-3.74 GM-%(50ML) RECONSTITUTED SOLUTION: Performed by: NURSE PRACTITIONER

## 2023-08-24 RX ORDER — CEFTRIAXONE 1 G/50ML
1000 INJECTION, SOLUTION INTRAVENOUS EVERY 24 HOURS
Status: CANCELLED | OUTPATIENT
Start: 2023-08-24

## 2023-08-24 RX ORDER — CEFTRIAXONE 1 G/50ML
1000 INJECTION, SOLUTION INTRAVENOUS EVERY 24 HOURS
Status: COMPLETED | OUTPATIENT
Start: 2023-08-24 | End: 2023-08-24

## 2023-08-24 RX ADMIN — CEFTRIAXONE 1000 MG: 1 INJECTION, SOLUTION INTRAVENOUS at 15:11

## 2023-08-24 NOTE — NURSING NOTE
1500  Pt here per wheelchair with father for last dose Rocephin IV thru midline.  1705  Pt discharged per wheelchair with father. VSS.  Alcohol pads, sterile saline flushes and gloves given to father to flush midline daily with 20 mls saline.  Father demonstrated and taught to flush midline.  Green alcohol caps given to family also.  Urine in and out cath sent to lab for culture as ordered.  Pt still with symptoms of groin pain, small amt back and abd pain and burning pain post cath and some nausea but all of discomforts are much improved over 7 days.  [facility]; spoke with nurse Espinosa to inform of above symptoms.

## 2023-08-24 NOTE — PATIENT INSTRUCTIONS
"  Call  Juhi Nuñez @ [phone]  if you have any problems or concerns.    We know you have a Choice in healthcare and appreciate you using [facility].  Our purpose is to provide you \"Excellent Care\".  We hope that you will always choose us in the future and continue to recommend us to your family and friends.              "

## 2023-08-25 LAB — BACTERIA SPEC AEROBE CULT: NO GROWTH

## 2023-08-28 ENCOUNTER — HOSPITAL ENCOUNTER (OUTPATIENT)
Dept: INFUSION THERAPY | Facility: HOSPITAL | Age: 23
Discharge: HOME | End: 2023-08-28
Admitting: NURSE PRACTITIONER
Payer: MEDICAID

## 2023-08-28 VITALS
OXYGEN SATURATION: 95 % | RESPIRATION RATE: 18 BRPM | SYSTOLIC BLOOD PRESSURE: 116 MMHG | HEART RATE: 86 BPM | TEMPERATURE: 97 F | DIASTOLIC BLOOD PRESSURE: 82 MMHG

## 2023-08-28 DIAGNOSIS — N39.0 URINARY TRACT INFECTION WITHOUT HEMATURIA, SITE UNSPECIFIED: Primary | ICD-10-CM

## 2023-08-28 PROCEDURE — G0463 HOSPITAL OUTPT CLINIC VISIT: HCPCS

## 2023-08-28 NOTE — PATIENT INSTRUCTIONS
"  Call [facility] at [phone] if you have any problems or concerns.    We know you have a Choice in healthcare and appreciate you using [facility].  Our purpose is to provide you \"Excellent Care\".  We hope that you will always choose us in the future and continue to recommend us to your family and friends.              "

## 2023-08-28 NOTE — NURSING NOTE
Pt arrived to [facility] for appt. VSS. Educated pt on what to expect with midline removal. Educated pt on to remove dressing in 24 hours. Pt and family verbalized understanding. Removed midline. Applied dressing. Pt tolerated well. Pt discharged at 14:20 PM in stable condition, without complaints.

## 2023-12-08 PROBLEM — M54.2 NECK PAIN: Status: ACTIVE | Noted: 2020-07-10

## 2023-12-08 PROBLEM — I47.11 INAPPROPRIATE SINUS TACHYCARDIA: Status: ACTIVE | Noted: 2021-05-20

## 2023-12-08 PROBLEM — N92.3 VAGINAL BLEEDING BETWEEN PERIODS: Status: ACTIVE | Noted: 2020-04-28

## 2023-12-08 PROBLEM — R13.19 OTHER DYSPHAGIA: Status: ACTIVE | Noted: 2023-04-01

## 2023-12-08 PROBLEM — G93.9 DISORDER OF BRAIN: Status: ACTIVE | Noted: 2022-04-22

## 2023-12-08 PROBLEM — F43.10 POSTTRAUMATIC STRESS DISORDER: Status: ACTIVE | Noted: 2020-07-27

## 2023-12-08 PROBLEM — F41.9 ANXIETY: Status: ACTIVE | Noted: 2019-08-27

## 2023-12-08 PROBLEM — J45.909 ASTHMA: Status: ACTIVE | Noted: 2021-03-23

## 2023-12-08 PROBLEM — K21.00 GASTROESOPHAGEAL REFLUX DISEASE WITH ESOPHAGITIS: Status: ACTIVE | Noted: 2021-11-30

## 2023-12-08 PROBLEM — K59.04 CHRONIC IDIOPATHIC CONSTIPATION: Status: ACTIVE | Noted: 2023-04-01

## 2023-12-08 PROBLEM — R10.9 ABDOMINAL PAIN: Status: ACTIVE | Noted: 2020-10-16

## 2023-12-08 PROBLEM — R32 URINARY INCONTINENCE: Status: ACTIVE | Noted: 2023-07-20

## 2023-12-08 PROBLEM — R55 SYNCOPE AND COLLAPSE: Status: ACTIVE | Noted: 2022-01-11

## 2023-12-08 PROBLEM — F44.9 CONVERSION DISORDER: Status: ACTIVE | Noted: 2023-12-08

## 2023-12-08 PROBLEM — N39.0 URINARY TRACT INFECTIOUS DISEASE: Status: ACTIVE | Noted: 2023-08-18

## 2023-12-08 PROBLEM — S09.90XA CLOSED INJURY OF HEAD: Status: ACTIVE | Noted: 2022-04-22

## 2023-12-08 PROBLEM — G43.909 MIGRAINE HEADACHE: Status: ACTIVE | Noted: 2023-12-08

## 2023-12-08 PROBLEM — M54.9 BACKACHE: Status: ACTIVE | Noted: 2020-10-16

## 2023-12-08 PROBLEM — G43.109 MIGRAINE WITH AURA, NOT INTRACTABLE, WITHOUT STATUS MIGRAINOSUS: Status: ACTIVE | Noted: 2020-10-16

## 2023-12-08 PROBLEM — R11.0 NAUSEA: Status: ACTIVE | Noted: 2020-05-28

## 2023-12-08 PROBLEM — R00.0 TACHYCARDIA: Status: ACTIVE | Noted: 2022-10-20

## 2023-12-08 PROBLEM — K21.9 GASTROESOPHAGEAL REFLUX DISEASE: Status: ACTIVE | Noted: 2021-03-23

## 2023-12-08 PROBLEM — N61.1 ABSCESS OF BREAST: Status: ACTIVE | Noted: 2022-01-11

## 2023-12-08 PROBLEM — E86.0 LUETSCHER'S SYNDROME: Status: ACTIVE | Noted: 2021-09-30

## 2023-12-08 PROBLEM — I49.5 SINUS NODE DYSFUNCTION: Status: ACTIVE | Noted: 2022-10-04

## 2023-12-08 PROBLEM — I47.10 SVT (SUPRAVENTRICULAR TACHYCARDIA): Status: ACTIVE | Noted: 2022-10-20

## 2023-12-08 PROBLEM — I45.5 SINUS PAUSE: Status: ACTIVE | Noted: 2022-10-04

## 2023-12-08 PROBLEM — J35.9 CHRONIC DISEASE OF TONSILS AND ADENOIDS: Status: ACTIVE | Noted: 2022-05-19

## 2023-12-08 PROBLEM — E87.6 HYPOKALEMIA: Status: ACTIVE | Noted: 2022-11-08

## 2023-12-08 PROBLEM — R00.2 PALPITATIONS: Status: ACTIVE | Noted: 2021-05-20

## 2023-12-08 PROBLEM — G90.A POSTURAL ORTHOSTATIC TACHYCARDIA SYNDROME: Status: ACTIVE | Noted: 2023-03-27

## 2023-12-08 PROBLEM — N92.0 MENORRHAGIA: Status: ACTIVE | Noted: 2022-01-11

## 2023-12-08 PROBLEM — N93.9 ABNORMAL UTERINE BLEEDING: Status: ACTIVE | Noted: 2022-08-19

## 2023-12-08 PROBLEM — R06.00 DYSPNEA: Status: ACTIVE | Noted: 2020-07-10

## 2024-01-30 RX ORDER — DROXIDOPA 100 MG/1
1 CAPSULE ORAL 3 TIMES DAILY
Qty: 90 CAPSULE | Refills: 0 | OUTPATIENT
Start: 2024-01-30

## 2024-02-09 RX ORDER — DROXIDOPA 100 MG/1
1 CAPSULE ORAL 3 TIMES DAILY
Qty: 90 CAPSULE | Refills: 0 | OUTPATIENT
Start: 2024-02-09

## 2024-02-26 ENCOUNTER — TELEPHONE (OUTPATIENT)
Dept: CARDIOLOGY | Facility: CLINIC | Age: 24
End: 2024-02-26
Payer: MEDICAID

## 2024-02-26 NOTE — TELEPHONE ENCOUNTER
Left message for patient to call back. Per patient request for appointment.  Does she currently have a cardiologist? HUB can schedule first available with Dr. Mckeon.

## 2024-02-28 ENCOUNTER — OFFICE VISIT (OUTPATIENT)
Dept: CARDIOLOGY | Facility: CLINIC | Age: 24
End: 2024-02-28
Payer: MEDICAID

## 2024-02-28 VITALS
BODY MASS INDEX: 23.92 KG/M2 | HEART RATE: 84 BPM | WEIGHT: 130 LBS | DIASTOLIC BLOOD PRESSURE: 60 MMHG | OXYGEN SATURATION: 99 % | SYSTOLIC BLOOD PRESSURE: 102 MMHG | RESPIRATION RATE: 16 BRPM | HEIGHT: 62 IN

## 2024-02-28 DIAGNOSIS — R55 SYNCOPE AND COLLAPSE: ICD-10-CM

## 2024-02-28 DIAGNOSIS — I47.10 SVT (SUPRAVENTRICULAR TACHYCARDIA): ICD-10-CM

## 2024-02-28 DIAGNOSIS — Z95.0 S/P PLACEMENT OF CARDIAC PACEMAKER: ICD-10-CM

## 2024-02-28 DIAGNOSIS — G90.9 AUTONOMIC DYSFUNCTION: Primary | ICD-10-CM

## 2024-02-28 PROCEDURE — 93000 ELECTROCARDIOGRAM COMPLETE: CPT | Performed by: INTERNAL MEDICINE

## 2024-02-28 PROCEDURE — 1160F RVW MEDS BY RX/DR IN RCRD: CPT | Performed by: INTERNAL MEDICINE

## 2024-02-28 PROCEDURE — 1159F MED LIST DOCD IN RCRD: CPT | Performed by: INTERNAL MEDICINE

## 2024-02-28 PROCEDURE — 99213 OFFICE O/P EST LOW 20 MIN: CPT | Performed by: INTERNAL MEDICINE

## 2024-02-28 RX ORDER — DROXIDOPA 200 MG/1
200 CAPSULE ORAL 3 TIMES DAILY
Qty: 270 CAPSULE | Refills: 3 | Status: SHIPPED | OUTPATIENT
Start: 2024-02-28

## 2024-02-28 NOTE — PROGRESS NOTES
[facility]  [facility] C[facility]IOLOG[facility]  [address] [place] [address]  Dept: [phone]  Dept Fax: [phone]    Sepideh Joy  2000    Follow Up Office Visit Note    History of Present Illness:  Sepideh Joy is a 23 y.o. female who presents to the clinic for Follow-up.Autonomic dysfunction- Syncope - She states is better, still passes out, on Droxydopa 100 tid, we will increase to 200 tid, she has not been at the office since 2022, we have sent her to [facility] but seems her insurance has denied,  BP is 100.60 and no changes after standing in 3 minutes. She has history of SVT with ablation and then pacemaker was implanted, she sees Dr Silva .    The following portions of the patient's history were reviewed and updated as appropriate: allergies, current medications, past family history, past medical history, past social history, past surgical history, and problem list.    Medications:  albuterol  buPROPion SR  Desvenlafaxine Succinate ER tablet sustained-release 24 hour  Droxidopa capsule  esomeprazole  levonorgestrel intrauterine device  LORazepam  lubiprostone  meclizine  metoclopramide  promethazine  promethazine-dextromethorphan  Retin-A cream  topiramate    Subjective  Allergies   Allergen Reactions    Digoxin Other (See Comments)     Affects vision at night       Ivabradine Other (See Comments)     Nausea, vomitting. Weakness, breathing problems    Prucalopride Hives, Diarrhea, Itching, Nausea And Vomiting, Rash, Other (See Comments), GI Intolerance, Headache and Seizure     Gi distress, diziness        Past Medical History:   Diagnosis Date    Acid reflux     Anxiety     Asthma     Chest pain     w/tachycardia, hr up to 200s    Depression     Migraine     PTSD (post-traumatic stress disorder)     SVT (supraventricular tachycardia)     inappropriate sinus tach at times, Dr Silva () cardiology    Syncope     multiple d/t elevated HR and low BP    Trauma      Unable to pass urine     follows w/urology  [facility]       Past Surgical History:   Procedure Laterality Date    CARDIAC ELECTROPHYSIOLOGY STUDY AND ABLATION      x2, last 8/1/2022    CARDIAC PACEMAKER PLACEMENT      COLONOSCOPY N/A 06/19/2021    GYNECOLOGY EXAM UNDER ANESTHESIA N/A 09/16/2022    Procedure: GYNECOLOGY EXAM UNDER ANESTHESIA;  Surgeon: Melany Murray MD;  Location:  [facility];  Service: Obstetrics/Gynecology;  Laterality: N/A;    INTRAUTERINE DEVICE INSERTION N/A 09/16/2022    Procedure: INTRAUTERINE DEVICE INSERTION;  Surgeon: Melany Murray MD;  Location:  [facility];  Service: Obstetrics/Gynecology;  Laterality: N/A;    US GUIDED CYST ASPIRATION BREAST N/A 02/04/2022    WISDOM TOOTH EXTRACTION         Family History   Adopted: Yes   Problem Relation Age of Onset    Cervical cancer Mother     Breast cancer Maternal Grandmother     Malig Hyperthermia Neg Hx         Social History     Socioeconomic History    Marital status: Single   Tobacco Use    Smoking status: Never    Smokeless tobacco: Never    Tobacco comments:     Some passive smoke exposure. Denies vaping.   Vaping Use    Vaping Use: Never used   Substance and Sexual Activity    Alcohol use: Never    Drug use: Never    Sexual activity: Not Currently     Birth control/protection: I.U.D., Abstinence     Comment: Kyleena 9/2022       Review of Systems   Constitutional: Negative.    HENT: Negative.     Respiratory: Negative.     Cardiovascular: Negative.    Endocrine: Negative.    Genitourinary: Negative.    Musculoskeletal: Negative.    Skin: Negative.    Allergic/Immunologic: Negative.    Neurological: Negative.    Hematological: Negative.    Psychiatric/Behavioral: Negative.         Cardiovascular Procedures    ECHO/MUGA:  STRESS TESTS:   CARDIAC CATH:   DEVICES:   HOLTER:   CT/MRI:   VASCULAR:   CARDIOTHORACIC:     Objective  Vitals:    02/28/24 1410   BP: 102/60   BP Location: Right arm   Patient Position: Lying  "  Cuff Size: Adult   Pulse: 84   Resp: 16   SpO2: 99%   Weight: 59 kg (130 lb)   Height: 157.5 cm (62\")   PainSc: 0-No pain     Body mass index is 23.78 kg/m².     Physical Exam  Vitals reviewed.   Constitutional:       Appearance: Healthy appearance. Not in distress.   Neck:      Vascular: No JVR. JVD normal.   Pulmonary:      Effort: Pulmonary effort is normal.      Breath sounds: Normal breath sounds. No wheezing. No rhonchi. No rales.   Chest:      Chest wall: Not tender to palpatation.   Cardiovascular:      PMI at left midclavicular line. Normal rate. Regular rhythm. Normal S1. Normal S2.       Murmurs: There is no murmur.      No gallop.  No click. No rub.   Pulses:     Intact distal pulses.   Edema:     Peripheral edema absent.   Abdominal:      General: Bowel sounds are normal.      Palpations: Abdomen is soft.      Tenderness: There is no abdominal tenderness.   Musculoskeletal: Normal range of motion.         General: No tenderness. Skin:     General: Skin is warm and dry.   Neurological:      General: No focal deficit present.      Mental Status: Alert and oriented to person, place and time.        Diagnostic Data    ECG 12 Lead    Date/Time: 2/28/2024 2:39 PM  Performed by: Jay Mckeon MD    Authorized by: Jay Mckeon MD  Comparison: compared with previous ECG from 11/28/2022  Similar to previous ECG  Rhythm: sinus rhythm and paced  Rate: normal  BPM: 85  QRS axis: normal  Pacing: dual chamber pacing  Clinical impression: abnormal EKG        Assessment and Plan  Diagnoses and all orders for this visit:    Autonomic dysfunction-On Droxydopa better, will increase to 200 tid,  still passes out, has not being able to see the specialist at [place]    SVT (supraventricular tachycardia). S.p ablation, sees Dr Silva,     Syncope and collapse- persistent, less often, no major injuries.  S/p pacemaker implantation- she sees Dr Silva     Other orders  -     Droxidopa 200 MG " capsule; Take 200 mg by mouth 3 (Three) Times a Day.         Return in about 6 months (around 8/28/2024) for Recheck with Dr. Mckeon.    Jay Mckeon MD  02/28/2024

## 2024-07-26 ENCOUNTER — TELEPHONE (OUTPATIENT)
Dept: CARDIOLOGY | Facility: CLINIC | Age: 24
End: 2024-07-26
Payer: MEDICAID

## 2024-09-16 ENCOUNTER — OFFICE VISIT (OUTPATIENT)
Dept: CARDIOLOGY | Facility: CLINIC | Age: 24
End: 2024-09-16
Payer: MEDICAID

## 2024-09-16 VITALS
BODY MASS INDEX: 23.92 KG/M2 | WEIGHT: 130 LBS | RESPIRATION RATE: 18 BRPM | OXYGEN SATURATION: 99 % | HEIGHT: 62 IN | HEART RATE: 102 BPM | SYSTOLIC BLOOD PRESSURE: 112 MMHG | DIASTOLIC BLOOD PRESSURE: 68 MMHG

## 2024-09-16 DIAGNOSIS — I49.5 SINUS NODE DYSFUNCTION: ICD-10-CM

## 2024-09-16 DIAGNOSIS — I47.10 SVT (SUPRAVENTRICULAR TACHYCARDIA): Primary | ICD-10-CM

## 2024-09-16 DIAGNOSIS — R00.0 TACHYCARDIA: ICD-10-CM

## 2024-09-16 DIAGNOSIS — G90.A POSTURAL ORTHOSTATIC TACHYCARDIA SYNDROME: ICD-10-CM

## 2024-09-16 DIAGNOSIS — I45.5 SINUS PAUSE: ICD-10-CM

## 2024-09-16 DIAGNOSIS — R55 SYNCOPE AND COLLAPSE: ICD-10-CM

## 2024-09-16 DIAGNOSIS — Z95.0 S/P PLACEMENT OF CARDIAC PACEMAKER: ICD-10-CM

## 2024-09-16 PROCEDURE — 1160F RVW MEDS BY RX/DR IN RCRD: CPT | Performed by: INTERNAL MEDICINE

## 2024-09-16 PROCEDURE — 99213 OFFICE O/P EST LOW 20 MIN: CPT | Performed by: INTERNAL MEDICINE

## 2024-09-16 PROCEDURE — 93000 ELECTROCARDIOGRAM COMPLETE: CPT | Performed by: INTERNAL MEDICINE

## 2024-09-16 PROCEDURE — 1159F MED LIST DOCD IN RCRD: CPT | Performed by: INTERNAL MEDICINE

## 2024-09-16 RX ORDER — DROXIDOPA 300 MG/1
300 CAPSULE ORAL 3 TIMES DAILY
Qty: 270 CAPSULE | Refills: 0 | Status: SHIPPED | OUTPATIENT
Start: 2024-09-16

## 2025-01-02 ENCOUNTER — OFFICE VISIT (OUTPATIENT)
Dept: OBSTETRICS AND GYNECOLOGY | Facility: CLINIC | Age: 25
End: 2025-01-02
Payer: MEDICARE

## 2025-01-02 VITALS
SYSTOLIC BLOOD PRESSURE: 118 MMHG | BODY MASS INDEX: 25.4 KG/M2 | HEIGHT: 62 IN | DIASTOLIC BLOOD PRESSURE: 72 MMHG | WEIGHT: 138 LBS

## 2025-01-02 DIAGNOSIS — Z01.419 PAP SMEAR, AS PART OF ROUTINE GYNECOLOGICAL EXAMINATION: Primary | ICD-10-CM

## 2025-01-02 DIAGNOSIS — Z01.419 ROUTINE GYNECOLOGICAL EXAMINATION: ICD-10-CM

## 2025-01-02 DIAGNOSIS — R63.5 ABNORMAL WEIGHT GAIN: ICD-10-CM

## 2025-01-02 DIAGNOSIS — N64.3 GALACTORRHEA: ICD-10-CM

## 2025-01-02 DIAGNOSIS — Z11.3 SCREENING EXAMINATION FOR STD (SEXUALLY TRANSMITTED DISEASE): ICD-10-CM

## 2025-01-02 PROBLEM — N92.3 VAGINAL BLEEDING BETWEEN PERIODS: Status: RESOLVED | Noted: 2020-04-28 | Resolved: 2025-01-02

## 2025-01-02 PROBLEM — Z30.430 ENCOUNTER FOR IUD INSERTION: Status: RESOLVED | Noted: 2022-09-16 | Resolved: 2025-01-02

## 2025-01-02 PROBLEM — N93.8 DUB (DYSFUNCTIONAL UTERINE BLEEDING): Status: RESOLVED | Noted: 2022-08-19 | Resolved: 2025-01-02

## 2025-01-02 PROBLEM — N93.9 ABNORMAL UTERINE BLEEDING: Status: RESOLVED | Noted: 2022-08-19 | Resolved: 2025-01-02

## 2025-01-02 PROBLEM — N94.6 SEVERE DYSMENORRHEA: Status: RESOLVED | Noted: 2022-08-19 | Resolved: 2025-01-02

## 2025-01-02 LAB
B-HCG UR QL: NEGATIVE
BILIRUB BLD-MCNC: NEGATIVE MG/DL
CLARITY, POC: CLEAR
COLOR UR: NORMAL
EXPIRATION DATE: NORMAL
GLUCOSE UR STRIP-MCNC: NEGATIVE MG/DL
INTERNAL NEGATIVE CONTROL: NORMAL
INTERNAL POSITIVE CONTROL: NORMAL
KETONES UR QL: NEGATIVE
LEUKOCYTE EST, POC: NEGATIVE
Lab: NORMAL
NITRITE UR-MCNC: NEGATIVE MG/ML
PH UR: 5 [PH] (ref 5–8)
PROT UR STRIP-MCNC: NEGATIVE MG/DL
RBC # UR STRIP: NEGATIVE /UL
SP GR UR: 1.01 (ref 1–1.03)
UROBILINOGEN UR QL: NORMAL
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
## 2025-01-03 LAB
CYTOLOGIST CVX/VAG CYTO: NORMAL
CYTOLOGY CVX/VAG DOC CYTO: NORMAL
CYTOLOGY CVX/VAG DOC THIN PREP: NORMAL
DX ICD CODE: NORMAL
Lab: NORMAL
OTHER STN SPEC: NORMAL
PROLACTIN SERPL-MCNC: 267 NG/ML (ref 4.8–33.4)
STAT OF ADQ CVX/VAG CYTO-IMP: NORMAL
T3 SERPL-MCNC: 110 NG/DL (ref 80–200)
T4 FREE SERPL-MCNC: 1.02 NG/DL (ref 0.92–1.68)
THYROPEROXIDASE AB SERPL-ACNC: 9 IU/ML (ref 0–34)
TSH SERPL DL<=0.005 MIU/L-ACNC: 2.33 UIU/ML (ref 0.27–4.2)

## 2025-01-04 LAB
C TRACH RRNA SPEC QL NAA+PROBE: NEGATIVE
N GONORRHOEA RRNA SPEC QL NAA+PROBE: NEGATIVE
T VAGINALIS RRNA SPEC QL NAA+PROBE: NEGATIVE

## 2025-01-07 DIAGNOSIS — R79.89 ELEVATED PROLACTIN LEVEL: Primary | ICD-10-CM

## 2025-01-08 ENCOUNTER — TELEPHONE (OUTPATIENT)
Dept: OBSTETRICS AND GYNECOLOGY | Facility: CLINIC | Age: 25
End: 2025-01-08
Payer: MEDICARE

## 2025-01-08 NOTE — TELEPHONE ENCOUNTER
per radiologist protocol pt would need a diag mammo as prn also to with this if needed. We just need to get additional order to get pt scheduled correctly. Thank you

## 2025-01-10 DIAGNOSIS — R79.89 ELEVATED PROLACTIN LEVEL: Primary | ICD-10-CM

## 2025-01-10 DIAGNOSIS — N64.3 GALACTORRHEA: ICD-10-CM

## 2025-01-30 DIAGNOSIS — F41.9 ANXIETY DUE TO INVASIVE PROCEDURE: Primary | ICD-10-CM

## 2025-01-30 RX ORDER — LORAZEPAM 1 MG/1
TABLET ORAL
Qty: 3 TABLET | Refills: 0 | Status: SHIPPED | OUTPATIENT
Start: 2025-01-30

## 2025-02-03 ENCOUNTER — HOSPITAL ENCOUNTER (OUTPATIENT)
Dept: MRI IMAGING | Facility: HOSPITAL | Age: 25
Discharge: HOME | End: 2025-02-03
Admitting: OBSTETRICS & GYNECOLOGY
Payer: MEDICARE

## 2025-02-03 VITALS — SYSTOLIC BLOOD PRESSURE: 122 MMHG | DIASTOLIC BLOOD PRESSURE: 83 MMHG | HEART RATE: 93 BPM | OXYGEN SATURATION: 100 %

## 2025-02-03 DIAGNOSIS — R79.89 ELEVATED PROLACTIN LEVEL: ICD-10-CM

## 2025-02-03 PROCEDURE — 70553 MRI BRAIN STEM W/O & W/DYE: CPT

## 2025-02-03 PROCEDURE — 25510000002 GADOBENATE DIMEGLUMINE 529 MG/ML SOLUTION: Performed by: OBSTETRICS & GYNECOLOGY

## 2025-02-03 PROCEDURE — A9577 INJ MULTIHANCE: HCPCS | Performed by: OBSTETRICS & GYNECOLOGY

## 2025-02-03 RX ADMIN — GADOBENATE DIMEGLUMINE 12 ML: 529 INJECTION, SOLUTION INTRAVENOUS at 13:17

## 2025-02-03 NOTE — NURSING NOTE
Patient IV out, dressed, left to Entrance A with the courtesy cart driving her.  She will wait there for her father to return to drive her home.

## 2025-02-04 DIAGNOSIS — R79.89 ELEVATED PROLACTIN LEVEL: Primary | ICD-10-CM

## 2025-02-04 NOTE — PROGRESS NOTES
PIP= MRI of pituitary is normal. Given her high prolactin, please refer to endocrinology for evaluation

## 2025-02-13 ENCOUNTER — OFFICE VISIT (OUTPATIENT)
Dept: ENDOCRINOLOGY | Age: 25
End: 2025-02-13
Payer: MEDICARE

## 2025-02-13 ENCOUNTER — LAB (OUTPATIENT)
Facility: HOSPITAL | Age: 25
End: 2025-02-13
Payer: MEDICARE

## 2025-02-13 ENCOUNTER — OFFICE VISIT (OUTPATIENT)
Dept: OBSTETRICS AND GYNECOLOGY | Facility: CLINIC | Age: 25
End: 2025-02-13
Payer: MEDICARE

## 2025-02-13 VITALS
DIASTOLIC BLOOD PRESSURE: 72 MMHG | BODY MASS INDEX: 26.79 KG/M2 | HEIGHT: 62 IN | WEIGHT: 145.6 LBS | SYSTOLIC BLOOD PRESSURE: 110 MMHG

## 2025-02-13 VITALS
SYSTOLIC BLOOD PRESSURE: 118 MMHG | HEART RATE: 84 BPM | WEIGHT: 145.6 LBS | DIASTOLIC BLOOD PRESSURE: 70 MMHG | OXYGEN SATURATION: 99 % | TEMPERATURE: 98 F | BODY MASS INDEX: 26.62 KG/M2

## 2025-02-13 DIAGNOSIS — N64.3 GALACTORRHEA: Primary | ICD-10-CM

## 2025-02-13 DIAGNOSIS — R79.89 ELEVATED PROLACTIN LEVEL: ICD-10-CM

## 2025-02-13 DIAGNOSIS — R79.89 ELEVATED PROLACTIN LEVEL: Primary | ICD-10-CM

## 2025-02-13 DIAGNOSIS — Z13.89 SCREENING FOR GENITOURINARY CONDITION: ICD-10-CM

## 2025-02-13 LAB
ALBUMIN SERPL-MCNC: 4.3 G/DL (ref 3.5–5.2)
ALBUMIN/GLOB SERPL: 1.7 G/DL
ALP SERPL-CCNC: 93 U/L (ref 39–117)
ALT SERPL W P-5'-P-CCNC: 13 U/L (ref 1–33)
ANION GAP SERPL CALCULATED.3IONS-SCNC: 8.8 MMOL/L (ref 5–15)
AST SERPL-CCNC: 19 U/L (ref 1–32)
BILIRUB SERPL-MCNC: 0.3 MG/DL (ref 0–1.2)
BUN SERPL-MCNC: 11 MG/DL (ref 6–20)
BUN/CREAT SERPL: 13.8 (ref 7–25)
CALCIUM SPEC-SCNC: 9.3 MG/DL (ref 8.6–10.5)
CHLORIDE SERPL-SCNC: 105 MMOL/L (ref 98–107)
CO2 SERPL-SCNC: 25.2 MMOL/L (ref 22–29)
CREAT SERPL-MCNC: 0.8 MG/DL (ref 0.57–1)
EGFRCR SERPLBLD CKD-EPI 2021: 106.3 ML/MIN/1.73
GLOBULIN UR ELPH-MCNC: 2.5 GM/DL
GLUCOSE SERPL-MCNC: 158 MG/DL (ref 65–99)
POTASSIUM SERPL-SCNC: 4.2 MMOL/L (ref 3.5–5.2)
PROT SERPL-MCNC: 6.8 G/DL (ref 6–8.5)
SODIUM SERPL-SCNC: 139 MMOL/L (ref 136–145)

## 2025-02-13 PROCEDURE — 36415 COLL VENOUS BLD VENIPUNCTURE: CPT

## 2025-02-13 PROCEDURE — 84146 ASSAY OF PROLACTIN: CPT

## 2025-02-13 PROCEDURE — 80053 COMPREHEN METABOLIC PANEL: CPT | Performed by: STUDENT IN AN ORGANIZED HEALTH CARE EDUCATION/TRAINING PROGRAM

## 2025-02-13 RX ORDER — LIDOCAINE 50 MG/G
PATCH TOPICAL
COMMUNITY

## 2025-02-13 NOTE — PROGRESS NOTES
"Chief Complaint/reason for consult    Elevated prolactin level (Pt states she's having a lot of breast leakage )      History of Present Illness      Referred for further management of elevated prolactin    Bilataeral galactorrhea for 2 months , flowing on its own without the squeezing the nipple    No headches   Sometimes experiencing blurry vision  Has gastroparesis, not taking metoclopramide regularly    States the last time that she took amitriptyline and metoclopramide was over 2 weeks ago    MRI pituitary February 2025:  Normal MRI of the brain and pituitary. Specifically, I see no  pituitary adenoma. Of course, correlate with pituitary laboratory values  as to whether there is convincing hormonal evidence of a tiny hormone  producing microadenoma which is too small to see on an MRI.      Has  Seizure      Hx of SVT status post ablation and pacemaker placement      Component      Latest Ref Rng 1/2/2025   Free T4      0.92 - 1.68 ng/dL 1.02    Thyroid Peroxidase Antibody      0 - 34 IU/mL 9    TSH Baseline      0.270 - 4.200 uIU/mL 2.330    Prolactin      4.8 - 33.4 ng/mL 267.0 (H)       Legend:  (H) High      Objective   Vital Signs:  /70   Pulse 84   Temp 98 °F (36.7 °C)   Wt 66 kg (145 lb 9.6 oz)   SpO2 99%   BMI 26.62 kg/m²   Estimated body mass index is 26.62 kg/m² as calculated from the following:    Height as of an earlier encounter on 2/13/25: 157.5 cm (62.01\").    Weight as of this encounter: 66 kg (145 lb 9.6 oz).           Physical Exam  Constitutional:       Appearance: Normal appearance.   Cardiovascular:      Rate and Rhythm: Normal rate.   Pulmonary:      Effort: Pulmonary effort is normal.      Breath sounds: Normal breath sounds. No wheezing.   Abdominal:      Palpations: Abdomen is soft.      Tenderness: There is no abdominal tenderness.   Musculoskeletal:         General: No swelling.      Cervical back: Neck supple. No tenderness.   Neurological:      Mental Status: She is alert " and oriented to person, place, and time.   Psychiatric:         Mood and Affect: Mood normal.        Result Review :  The following data was reviewed by: Mahrokh Nokhbehzaeim, MD on 02/13/2025:    TSH          1/2/2025    10:49   TSH   TSH 2.330       Free T4   Date Value Ref Range Status   01/02/2025 1.02 0.92 - 1.68 ng/dL Final      Prolactin   Date Value Ref Range Status   01/02/2025 267.0 (H) 4.8 - 33.4 ng/mL Final      Data reviewed : Radiologic studies Pituitary  MRI             Assessment and Plan   Diagnoses and all orders for this visit:    1. Elevated prolactin level (Primary)  Assessment & Plan:  Galactorrhea for 2 months  No headaches or peripheral vision loss  Pituitary MRI unremarkable  Amitriptyline can slightly increase the prolactin level  Metoclopramide can increase prolactin level by blocking dopamine D2 receptor  Did not take metoclopramide and amitriptyline for 2 weeks  Repeat prolactin level this morning is pending  Will check macro prolactin level  May have a small/tiny pituitary adenoma which is not detectable on MRI Will benefit from cabergoline    Orders:  -     Macroprolactin; Future  -     Comprehensive Metabolic Panel             Follow Up   Return in about 3 months (around 5/13/2025).  Patient was given instructions and counseling regarding her condition or for health maintenance advice. Please see specific information pulled into the AVS if appropriate.

## 2025-02-13 NOTE — ASSESSMENT & PLAN NOTE
Galactorrhea for 2 months  No headaches or peripheral vision loss  Pituitary MRI unremarkable  Amitriptyline can slightly increase the prolactin level  Metoclopramide can increase prolactin level by blocking dopamine D2 receptor  Did not take metoclopramide and amitriptyline for 2 weeks  Repeat prolactin level this morning is pending  Will check macro prolactin level  May have a small/tiny pituitary adenoma which is not detectable on MRI Will benefit from cabergoline

## 2025-02-13 NOTE — PROGRESS NOTES
"      Sepideh Joy is a 24 y.o. patient who presents for follow up of   Chief Complaint   Patient presents with    Follow-up     Breast exam          25 yo est pt here for f/u B galactorrhea. She is c/o B breast discharge. This discharge comes from both sides but is more prominent on the left side. The discharge is spontaneous and provoked and multiductal. She had an elevated prolactin at 267.she had a pituitary MRI that was normal. She is still having breast discharge and sees endocrinology today. She has breast imaging scheduled for March. We discussed the importance of followup.     The following portions of the patient's history were reviewed and updated as appropriate: allergies, current medications and problem list.    Review of Systems   Constitutional:  Positive for activity change and unexpected weight change (gain). Negative for appetite change, fatigue and fever.   Eyes:  Negative for photophobia and visual disturbance.   Respiratory:  Negative for cough and shortness of breath.    Cardiovascular:  Negative for chest pain and palpitations.   Gastrointestinal:  Negative for abdominal distention, abdominal pain, constipation, diarrhea and nausea.   Endocrine: Negative for cold intolerance and heat intolerance.   Genitourinary:  Negative for difficulty urinating, dyspareunia, dysuria, menstrual problem, pelvic pain, vaginal bleeding and vaginal discharge.   Musculoskeletal:  Negative for back pain.        B breast discahrge   Skin:  Negative for color change and rash.   Neurological:  Positive for headaches. Negative for weakness.   Hematological:  Negative for adenopathy. Does not bruise/bleed easily.   Psychiatric/Behavioral:  Negative for dysphoric mood. The patient is not nervous/anxious.        /72   Ht 157.5 cm (62.01\")   Wt 66 kg (145 lb 9.6 oz)   BMI 26.62 kg/m²     Physical Exam  Vitals and nursing note reviewed.   Constitutional:       Appearance: She is well-developed.   HENT:      " Head: Normocephalic and atraumatic.   Eyes:      General: No scleral icterus.     Conjunctiva/sclera: Conjunctivae normal.   Neck:      Thyroid: No thyromegaly.   Abdominal:      General: There is no distension.      Palpations: Abdomen is soft. There is no mass.      Tenderness: There is no abdominal tenderness. There is no guarding or rebound.      Hernia: No hernia is present.   Skin:     General: Skin is warm and dry.   Neurological:      Mental Status: She is alert and oriented to person, place, and time.   Psychiatric:         Behavior: Behavior normal.         Thought Content: Thought content normal.         Judgment: Judgment normal.         A/P:  1. Galactorrhea- nipple US scheduled for next month  2. Elevated prolactin- check prolactin level today. MRI pituitary normal. Sees endocrinology today for further w/u.  3. RHM- UTD annual 1/2025  4. RTO in 4 months repeat exam.     Assessment & Plan   Diagnoses and all orders for this visit:    1. Galactorrhea (Primary)  -     Prolactin    2. Screening for genitourinary condition  -     Cancel: POC Urinalysis Dipstick  -     Cancel: POC Pregnancy, Urine    3. Elevated prolactin level                 No follow-ups on file.      Melany Murray MD    2/13/2025  11:50 EST

## 2025-02-14 LAB — PROLACTIN SERPL-MCNC: 167 NG/ML (ref 4.8–33.4)

## 2025-02-16 LAB
MACROPROLACTIN/PROLACTIN MFR SERPL: 15 %
PROLACTIN SERPL-MCNC: 144 NG/ML
PROLACTIN.MONOMERIC SERPL-MCNC: 123 NG/ML

## 2025-02-18 ENCOUNTER — PATIENT MESSAGE (OUTPATIENT)
Dept: ENDOCRINOLOGY | Age: 25
End: 2025-02-18
Payer: MEDICARE

## 2025-02-18 DIAGNOSIS — R79.89 ELEVATED PROLACTIN LEVEL: Primary | ICD-10-CM

## 2025-02-19 ENCOUNTER — TELEPHONE (OUTPATIENT)
Dept: ENDOCRINOLOGY | Age: 25
End: 2025-02-19
Payer: MEDICARE

## 2025-02-19 RX ORDER — CABERGOLINE 0.5 MG/1
0.5 TABLET ORAL 2 TIMES WEEKLY
Qty: 24 TABLET | Refills: 3 | Status: SHIPPED | OUTPATIENT
Start: 2025-02-20 | End: 2026-02-20

## 2025-02-19 NOTE — TELEPHONE ENCOUNTER
Called and discussed the results, prolactin is elevated  Will start cabergoline 0.5 mg twice a week and repeat fasting prolactin level in 6 weeks  It is better to take the medication at night  Side effects including nausea, dizziness, lightheadedness, orthostatic hypotension, palpitations discussed in detail  She does not take sumatriptan  Advised to call office if experiencing any side effects

## 2025-02-19 NOTE — ADDENDUM NOTE
Addended by: NOKHBEHZAEIM, MAHROKH on: 2/19/2025 12:02 PM     Modules accepted: Orders     Patient Education        Sciatica: Exercises  Your Care Instructions  Here are some examples of typical rehabilitation exercises for your condition. Start each exercise slowly. Ease off the exercise if you start to have pain. Your doctor or physical therapist will tell you when you can start these exercises and which ones will work best for you. When you are not being active, find a comfortable position for rest. Some people are comfortable on the floor or a medium-firm bed with a small pillow under their head and another under their knees. Some people prefer to lie on their side with a pillow between their knees. Don't stay in one position for too long. Take short walks (10 to 20 minutes) every 2 to 3 hours. Avoid slopes, hills, and stairs until you feel better. Walk only distances you can manage without pain, especially leg pain. How to do the exercises  Back stretches    1. Get down on your hands and knees on the floor. 2. Relax your head and allow it to droop. Round your back up toward the ceiling until you feel a nice stretch in your upper, middle, and lower back. Hold this stretch for as long as it feels comfortable, or about 15 to 30 seconds. 3. Return to the starting position with a flat back while you are on your hands and knees. 4. Let your back sway by pressing your stomach toward the floor. Lift your buttocks toward the ceiling. 5. Hold this position for 15 to 30 seconds. 6. Repeat 2 to 4 times. Follow-up care is a key part of your treatment and safety. Be sure to make and go to all appointments, and call your doctor if you are having problems. It's also a good idea to know your test results and keep a list of the medicines you take. Where can you learn more? Go to https://haley.Brandicted. org and sign in to your CleanFish account. Enter O192 in the Accolo [id number] [employer], [employer]. Care instructions adapted under license by [facility] ([facility]). [facility] ([facility]). 

## 2025-02-24 ENCOUNTER — PATIENT MESSAGE (OUTPATIENT)
Dept: ENDOCRINOLOGY | Age: 25
End: 2025-02-24
Payer: MEDICARE

## 2025-02-26 NOTE — TELEPHONE ENCOUNTER
Called and spoke with Sepideh, has taken 2 doses of cabergoline so far  Started having intermittent abdominal pain 2 days after the second dose  States that the pain is located in lower belly  Comes and goes  Feels fine today  Cabergoline can cause nausea and abdominal pain  Advised to seek medical attention,  go to the urgent care/ ER or see family physician if abdominal pain does not improve  If abdominal pain gets worse or still has abdominal pain with the next dose of the cabergoline , will stop the cabergoline  Advised to inform me  Expressed understanding of the information provided

## 2025-03-17 ENCOUNTER — OFFICE VISIT (OUTPATIENT)
Dept: CARDIOLOGY | Facility: CLINIC | Age: 25
End: 2025-03-17
Payer: MEDICARE

## 2025-03-17 VITALS
DIASTOLIC BLOOD PRESSURE: 68 MMHG | WEIGHT: 142 LBS | RESPIRATION RATE: 18 BRPM | HEIGHT: 62 IN | SYSTOLIC BLOOD PRESSURE: 112 MMHG | OXYGEN SATURATION: 99 % | HEART RATE: 86 BPM | BODY MASS INDEX: 26.13 KG/M2 | TEMPERATURE: 97 F

## 2025-03-17 DIAGNOSIS — I49.5 SINUS NODE DYSFUNCTION: ICD-10-CM

## 2025-03-17 DIAGNOSIS — Z95.0 S/P PLACEMENT OF CARDIAC PACEMAKER: ICD-10-CM

## 2025-03-17 DIAGNOSIS — G90.A POSTURAL ORTHOSTATIC TACHYCARDIA SYNDROME: Primary | ICD-10-CM

## 2025-03-17 DIAGNOSIS — R55 SYNCOPE AND COLLAPSE: ICD-10-CM

## 2025-03-17 PROBLEM — R00.0 TACHYCARDIA: Status: RESOLVED | Noted: 2022-10-20 | Resolved: 2025-03-17

## 2025-03-17 PROCEDURE — 1159F MED LIST DOCD IN RCRD: CPT | Performed by: INTERNAL MEDICINE

## 2025-03-17 PROCEDURE — 1160F RVW MEDS BY RX/DR IN RCRD: CPT | Performed by: INTERNAL MEDICINE

## 2025-03-17 PROCEDURE — 99214 OFFICE O/P EST MOD 30 MIN: CPT | Performed by: INTERNAL MEDICINE

## 2025-03-17 RX ORDER — DROXIDOPA 300 MG/1
600 CAPSULE ORAL 3 TIMES DAILY
Qty: 540 CAPSULE | Refills: 3 | Status: SHIPPED | OUTPATIENT
Start: 2025-03-17

## 2025-03-17 NOTE — PROGRESS NOTES
[facility] [place]  [facility] CARDIOLOGY  [address] [place] [address]  Dept: [phone]  Dept Fax: [phone]    Sepideh Joy  2000    Follow Up Office Visit Note    History of Present Illness:  Sepideh Joy is a 24 y.o. female who presents to the clinic for Follow-up. Syncope- She keeps passing out but she is much better, on Northera 300 mg tid, will increase to 600 mg bid and then TID. ,, her pacemaker and SVT are follow up with Dr Silva, and seem her evaluation is normal    The following portions of the patient's history were reviewed and updated as appropriate: allergies, current medications, past family history, past medical history, past social history, past surgical history, and problem list.    Medications:  acyclovir  albuterol  amitriptyline  cabergoline  celecoxib  cetirizine  clindamycin swab  cyclobenzaprine  Desvenlafaxine Succinate ER tablet sustained-release 24 hour  droxidopa capsule  Ibsrela tablet  ibuprofen  levonorgestrel intrauterine device  lidocaine  LORazepam  meclizine  metoclopramide  ondansetron ODT  promethazine  Retin-A cream  Scopolamine  SUMAtriptan  topiramate  Ventolin HFA aerosol solution    Subjective  Allergies   Allergen Reactions    Digoxin Other (See Comments)     Affects vision at night       Ivabradine Other (See Comments)     Nausea, vomitting. Weakness, breathing problems    Prucalopride Hives, Diarrhea, Itching, Nausea And Vomiting, Rash, Other (See Comments), GI Intolerance, Headache and Seizure     Gi distress, diziness        Past Medical History:   Diagnosis Date    Acid reflux     Anxiety     Asthma     Chest pain     w/tachycardia, hr up to 200s    Depression     Migraine     PTSD (post-traumatic stress disorder)     SVT (supraventricular tachycardia)     inappropriate sinus tach at times, Dr Silva () cardiology    Syncope     multiple d/t elevated HR and low BP    Trauma     Unable to pass urine     follows  w/urology  SE[facility] CAT[facility]       Past Surgical History:   Procedure Laterality Date    CARDIAC ELECTROPHYSIOLOGY STUDY AND ABLATION      x2, last 8/1/2022    CARDIAC PACEMAKER PLACEMENT      COLONOSCOPY N/A 06/19/2021    GYNECOLOGY EXAM UNDER ANESTHESIA N/A 09/16/2022    Procedure: GYNECOLOGY EXAM UNDER ANESTHESIA;  Surgeon: Melany Murray MD;  Location: [facility] OR;  Service: Obstetrics/Gynecology;  Laterality: N/A;    INTRAUTERINE DEVICE INSERTION N/A 09/16/2022    Procedure: INTRAUTERINE DEVICE INSERTION;  Surgeon: Melany Murray MD;  Location:  [facility] OR;  Service: Obstetrics/Gynecology;  Laterality: N/A;    LYMPH NODE BIOPSY      US GUIDED CYST ASPIRATION BREAST N/A 02/04/2022    WISDOM TOOTH EXTRACTION         Family History   Adopted: Yes   Problem Relation Age of Onset    Cervical cancer Mother     Breast cancer Maternal Grandmother     Malig Hyperthermia Neg Hx         Social History     Socioeconomic History    Marital status: Single   Tobacco Use    Smoking status: Never    Smokeless tobacco: Never    Tobacco comments:     Some passive smoke exposure. Denies vaping.   Vaping Use    Vaping status: Never Used   Substance and Sexual Activity    Alcohol use: Never    Drug use: Never    Sexual activity: Not Currently     Birth control/protection: I.U.D., Abstinence     Comment: Kyleena 9/2022       Review of Systems   Constitutional: Negative.    HENT: Negative.     Respiratory: Negative.     Cardiovascular: Negative.    Endocrine: Negative.    Genitourinary: Negative.    Musculoskeletal: Negative.    Skin: Negative.    Allergic/Immunologic: Negative.    Neurological: Negative.    Hematological: Negative.    Psychiatric/Behavioral: Negative.     All other systems reviewed and are negative.    Cardiovascular Procedures    ECHO/MUGA:  STRESS TESTS:   CARDIAC CATH:   DEVICES:   HOLTER:   CT/MRI:   VASCULAR:   CARDIOTHORACIC:     Objective  Vitals:    03/17/25 1032   BP: 112/68   BP Location: Right  "arm   Patient Position: Sitting   Cuff Size: Adult   Pulse: 86   Resp: 18   Temp: 97 °F (36.1 °C)   TempSrc: Infrared   SpO2: 99%   Weight: 64.4 kg (142 lb)   Height: 157.5 cm (62.01\")   PainSc: 0-No pain     Body mass index is 25.96 kg/m².     Physical Exam  Vitals reviewed.   Constitutional:       Appearance: Healthy appearance. Not in distress.   Neck:      Vascular: No JVR. JVD normal.   Pulmonary:      Effort: Pulmonary effort is normal.      Breath sounds: Normal breath sounds. No wheezing. No rhonchi. No rales.   Chest:      Chest wall: Not tender to palpatation.   Cardiovascular:      PMI at left midclavicular line. Normal rate. Regular rhythm. Normal S1. Normal S2.       Murmurs: There is no murmur.      No gallop.  No click. No rub.   Pulses:     Intact distal pulses.   Edema:     Peripheral edema absent.   Abdominal:      General: Bowel sounds are normal.      Palpations: Abdomen is soft.      Tenderness: There is no abdominal tenderness.   Musculoskeletal: Normal range of motion.         General: No tenderness. Skin:     General: Skin is warm and dry.   Neurological:      General: No focal deficit present.      Mental Status: Alert and oriented to person, place and time.        Diagnostic Data  Procedures    Assessment and Plan  Diagnoses and all orders for this visit:    Postural orthostatic tachycardia syndrome- better, still  having some syncope, will increase further the Northera, 600 tid    S/P placement of cardiac pacemaker- this is follow up with Dr Butler    Sinus node dysfunction- as above     Syncope and collapse- better , keep passing out, taking a shower, will increase further the Northera to 600 tid, advised to keep drinking plenty fluids,    Other orders  -     droxidopa (NORTHERA) 300 MG capsule capsule; Take 2 capsules by mouth 3 (Three) Times a Day.         Return in about 6 months (around 9/17/2025) for Recheck with Dr. Mckeon.    Jay Mckeon MD  03/17/2025  "

## 2025-03-20 ENCOUNTER — HOSPITAL ENCOUNTER (OUTPATIENT)
Dept: ULTRASOUND IMAGING | Facility: HOSPITAL | Age: 25
Discharge: HOME | End: 2025-03-20
Admitting: OBSTETRICS & GYNECOLOGY
Payer: MEDICARE

## 2025-03-20 DIAGNOSIS — N64.3 GALACTORRHEA: ICD-10-CM

## 2025-03-20 PROCEDURE — 76642 ULTRASOUND BREAST LIMITED: CPT

## 2025-03-20 PROCEDURE — 76641 ULTRASOUND BREAST COMPLETE: CPT

## 2025-03-20 PROCEDURE — 76642 ULTRASOUND BREAST LIMITED: CPT | Performed by: RADIOLOGY

## 2025-03-31 ENCOUNTER — TELEPHONE (OUTPATIENT)
Dept: CARDIOLOGY | Facility: CLINIC | Age: 25
End: 2025-03-31
Payer: MEDICARE

## 2025-03-31 NOTE — TELEPHONE ENCOUNTER
Droxidopa 300mg capsules  Outcome  Approved today by [employer] NCPDP 2017  Your request has been approved  Effective Date: 1/1/2025  Authorization Expiration Date: 6/29/2025

## 2025-04-17 ENCOUNTER — LAB (OUTPATIENT)
Dept: LAB | Facility: HOSPITAL | Age: 25
End: 2025-04-17
Payer: MEDICARE

## 2025-04-17 DIAGNOSIS — R79.89 ELEVATED PROLACTIN LEVEL: ICD-10-CM

## 2025-04-17 LAB — PROLACTIN SERPL-MCNC: 274 NG/ML (ref 4.79–23.3)

## 2025-04-17 PROCEDURE — 36415 COLL VENOUS BLD VENIPUNCTURE: CPT

## 2025-04-17 PROCEDURE — 84146 ASSAY OF PROLACTIN: CPT

## 2025-04-21 ENCOUNTER — TELEPHONE (OUTPATIENT)
Dept: ENDOCRINOLOGY | Age: 25
End: 2025-04-21
Payer: MEDICARE

## 2025-04-21 NOTE — TELEPHONE ENCOUNTER
Faxed most recent office notes to [facility] Blood cancers, cellular therapeutics and transplant program. Fax : [phone]

## 2025-04-21 NOTE — TELEPHONE ENCOUNTER
Caller: [facility]    Relationship:     Best call back number: [phone]    What form or medical record are you requesting: LAST OFFICE NOTES AND LABS    How would you like to receive the form or medical records (pick-up, mail, fax): FAX  If fax, what is the fax number: [phone]    Timeframe paperwork needed: ASAP

## 2025-04-24 ENCOUNTER — RESULTS FOLLOW-UP (OUTPATIENT)
Dept: ENDOCRINOLOGY | Age: 25
End: 2025-04-24
Payer: MEDICARE

## 2025-04-24 DIAGNOSIS — R79.89 ELEVATED PROLACTIN LEVEL: Primary | ICD-10-CM

## 2025-04-24 NOTE — TELEPHONE ENCOUNTER
Called and discussed the results, states that she has been taking cabergoline 1 tablet twice a week  Despite the treatment cabergoline is trending up.  Does not take multivitamin or biotin. Repeat fasting prolactin in 1 week.  Some of her medications may affect the prolactin level but usually does not cause significant elevation of the prolactin levels.  Further testing and management based on results

## 2025-05-14 ENCOUNTER — TELEPHONE (OUTPATIENT)
Dept: ENDOCRINOLOGY | Age: 25
End: 2025-05-14
Payer: MEDICARE

## 2025-05-14 NOTE — TELEPHONE ENCOUNTER
Called and left message for patient that Dr. DAVIS would like for her to get her prolactin checked before her upcoming appointment.

## 2025-05-16 ENCOUNTER — LAB (OUTPATIENT)
Dept: LAB | Facility: HOSPITAL | Age: 25
End: 2025-05-16
Payer: MEDICARE

## 2025-05-16 DIAGNOSIS — R79.89 ELEVATED PROLACTIN LEVEL: ICD-10-CM

## 2025-05-16 LAB — PROLACTIN SERPL-MCNC: 173 NG/ML (ref 4.79–23.3)

## 2025-05-16 PROCEDURE — 84146 ASSAY OF PROLACTIN: CPT

## 2025-05-16 PROCEDURE — 36415 COLL VENOUS BLD VENIPUNCTURE: CPT

## 2025-05-21 ENCOUNTER — OFFICE VISIT (OUTPATIENT)
Dept: ENDOCRINOLOGY | Age: 25
End: 2025-05-21
Payer: MEDICARE

## 2025-05-21 VITALS
DIASTOLIC BLOOD PRESSURE: 74 MMHG | WEIGHT: 145 LBS | BODY MASS INDEX: 26.51 KG/M2 | HEART RATE: 78 BPM | SYSTOLIC BLOOD PRESSURE: 110 MMHG | OXYGEN SATURATION: 94 % | TEMPERATURE: 97.9 F

## 2025-05-21 DIAGNOSIS — R79.89 ELEVATED PROLACTIN LEVEL: Primary | ICD-10-CM

## 2025-05-21 DIAGNOSIS — N92.6 IRREGULAR PERIODS: ICD-10-CM

## 2025-05-21 RX ORDER — CABERGOLINE 0.5 MG/1
1 TABLET ORAL 2 TIMES WEEKLY
Qty: 48 TABLET | Refills: 1 | Status: SHIPPED | OUTPATIENT
Start: 2025-05-22 | End: 2026-05-22

## 2025-05-21 RX ORDER — ERENUMAB-AOOE 140 MG/ML
INJECTION, SOLUTION SUBCUTANEOUS
COMMUNITY
Start: 2025-05-02

## 2025-05-21 RX ORDER — RIZATRIPTAN BENZOATE 10 MG/1
TABLET, ORALLY DISINTEGRATING ORAL
COMMUNITY
Start: 2025-03-26 | End: 2025-05-21

## 2025-05-21 NOTE — PROGRESS NOTES
Chief Complaint  Elevated prolactin level    Manisha Joy presents to [facility] ENDOCRINOLOGY for follow up     History of Present Illness        Bilataeral galactorrhea for few months, flowing on its own without the squeezing the nipple  Has migraine headaches, experiencing headaches and blurry visions  Has gastroparesis, restarted taking metoclopramide last month     MRI pituitary February 2025:  Normal MRI of the brain and pituitary. Specifically, I see no  pituitary adenoma. Of course, correlate with pituitary laboratory values  as to whether there is convincing hormonal evidence of a tiny hormone  producing microadenoma which is too small to see on an MRI.     Hx of Seizure      Hx of SVT status post ablation and pacemaker placement    Takes cabergoline 0.5 mg, 1 tablet twice a week.   Has irregular periods    Echocardiogram 2023  :    Mitral Valve   The mitral valve leaflets are normal in appearance with no evidence of mitral valve prolapse. There is trace mitral regurgitation. There is no mitral stenosis.     Tricuspid Valve   The tricuspid valve is normal in appearance. There is physiologic amount of tricuspid regurgitation. There is no tricuspid stenosis.     Aortic Valve   The aortic valve appears to be trileaflet. There is no valvular regurgitation. There is no hemodynamically significant valvular aortic stenosis.     Pulmonic Valve   The pulmonic valve is normal in appearance. There is mild pulmonic regurgitation. There is no pulmonic stenosis.       Hx of SVT status post ablation and pacemaker placement      Component      Latest Ref Rng 2/13/2025 4/17/2025 5/16/2025   Prolactin      4.79 - 23.30 ng/mL 144 (H)  274.00 (H)  173.00 (H)    Prolactin       167.0 (H)      Prolactin, Monomeric      ng/mL 123 (H)      Macroprolactin, %      % 15         Legend:  (H) High    Objective   Vital Signs:  /74   Pulse 78   Temp 97.9 °F (36.6 °C) (Oral)   Wt 65.8 kg  "(145 lb)   SpO2 94%   BMI 26.51 kg/m²   Estimated body mass index is 26.51 kg/m² as calculated from the following:    Height as of 3/17/25: 157.5 cm (62.01\").    Weight as of this encounter: 65.8 kg (145 lb).           Physical Exam  Constitutional:       Appearance: Normal appearance.   Cardiovascular:      Rate and Rhythm: Normal rate.   Pulmonary:      Effort: Pulmonary effort is normal.      Breath sounds: Normal breath sounds. No wheezing.   Abdominal:      Palpations: Abdomen is soft.      Tenderness: There is no abdominal tenderness.   Musculoskeletal:         General: No swelling.      Cervical back: Neck supple. No tenderness.   Neurological:      Mental Status: She is alert and oriented to person, place, and time.   Psychiatric:         Mood and Affect: Mood normal.        Result Review :  The following data was reviewed by: Mahrokh Nokhbehzaeim, MD on 05/21/2025:  CMP          2/13/2025    13:39   CMP   Glucose 158    BUN 11    Creatinine 0.80    EGFR 106.3    Sodium 139    Potassium 4.2    Chloride 105    Calcium 9.3    Total Protein 6.8    Albumin 4.3    Globulin 2.5    Total Bilirubin 0.3    Alkaline Phosphatase 93    AST (SGOT) 19    ALT (SGPT) 13    Albumin/Globulin Ratio 1.7    BUN/Creatinine Ratio 13.8    Anion Gap 8.8      CMP          2/13/2025    13:39   CMP   Glucose 158    BUN 11    Creatinine 0.80    EGFR 106.3    Sodium 139    Potassium 4.2    Chloride 105    Calcium 9.3    Total Protein 6.8    Albumin 4.3    Globulin 2.5    Total Bilirubin 0.3    Alkaline Phosphatase 93    AST (SGOT) 19    ALT (SGPT) 13    Albumin/Globulin Ratio 1.7    BUN/Creatinine Ratio 13.8    Anion Gap 8.8       TSH          1/2/2025    10:49 5/9/2025    12:23   TSH   TSH 2.330  1.26          Details          This result is from an external source.              Free T4   Date Value Ref Range Status   01/02/2025 1.02 0.92 - 1.68 ng/dL Final      Prolactin   Date Value Ref Range Status   05/16/2025 173.00 (H) 4.79 - " 23.30 ng/mL Final                   Assessment and Plan   Diagnoses and all orders for this visit:    1. Elevated prolactin level (Primary)  Assessment & Plan:  Unknown etiology   Restarted taking metoclopramide about 4 weeks ago which could cause high prolactin level  Will increase the dose of cabergoline to 2 tablets twice a week, repeat fasting prolactin level in 8 weeks  She will discuss metoclopramide with her GI doctor  Explained to patient and her father that high doses of the cabergoline may be associated with increased risk of heart valve disease (  in patients who take larger than typical doses of cabergoline (eg, greater than 2 mg per week) ) , if further increasing the dose of the cabergoline is required will repeat echocardiogram      Orders:  -     cabergoline (DOSTINEX) 0.5 MG tablet; Take 2 tablets by mouth 2 (Two) Times a Week.  Dispense: 48 tablet; Refill: 1  -     TSH; Future  -     Prolactin; Future  -     T4, Free; Future    2. Irregular periods  Assessment & Plan:  Check TFT    Orders:  -     TSH; Future  -     Prolactin; Future  -     T4, Free; Future             Follow Up   Return in about 4 months (around 9/21/2025).  Patient was given instructions and counseling regarding her condition or for health maintenance advice. Please see specific information pulled into the AVS if appropriate.

## 2025-05-21 NOTE — ASSESSMENT & PLAN NOTE
Unknown etiology   Restarted taking metoclopramide about 4 weeks ago which could cause high prolactin level  Will increase the dose of cabergoline to 2 tablets twice a week, repeat fasting prolactin level in 8 weeks  She will discuss metoclopramide with her GI doctor  Explained to patient and her father that high doses of the cabergoline may be associated with increased risk of heart valve disease (  in patients who take larger than typical doses of cabergoline (eg, greater than 2 mg per week) ) , if further increasing the dose of the cabergoline is required will repeat echocardiogram

## 2025-06-06 ENCOUNTER — TELEPHONE (OUTPATIENT)
Dept: CARDIOLOGY | Facility: CLINIC | Age: 25
End: 2025-06-06
Payer: MEDICARE

## 2025-06-09 RX ORDER — CLINDAMYCIN PHOSPHATE 11.9 MG/ML
1 SOLUTION TOPICAL DAILY
COMMUNITY
Start: 2025-03-04

## 2025-06-10 ENCOUNTER — TELEPHONE (OUTPATIENT)
Dept: CARDIOLOGY | Facility: CLINIC | Age: 25
End: 2025-06-10
Payer: MEDICARE

## 2025-06-10 NOTE — TELEPHONE ENCOUNTER
Droxidopa 300mg capsules for 90 day supply  Outcome  Approved today by [employer] NCPDP 2017  Your request has been approved  Effective Date: 1/1/2025  Authorization Expiration Date: 9/8/2025

## 2025-06-10 NOTE — TELEPHONE ENCOUNTER
"DROXIDOPA PRIOR AUTH WILL       [facility]    THEY DIDN'T UNDERSTAND ALL THE DETAILS BUT WANTED TO \"KEEP US IN THE LOOP WITH WHAT WAS GOING ON\"    [phone] CALL BACK NUMBER     "

## 2025-06-12 ENCOUNTER — OFFICE VISIT (OUTPATIENT)
Dept: OBSTETRICS AND GYNECOLOGY | Facility: CLINIC | Age: 25
End: 2025-06-12
Payer: MEDICARE

## 2025-06-12 VITALS
BODY MASS INDEX: 26.28 KG/M2 | WEIGHT: 142.8 LBS | DIASTOLIC BLOOD PRESSURE: 88 MMHG | HEIGHT: 62 IN | SYSTOLIC BLOOD PRESSURE: 108 MMHG

## 2025-06-12 DIAGNOSIS — N89.8 VAGINAL DISCHARGE: Primary | ICD-10-CM

## 2025-06-12 DIAGNOSIS — N64.52 NIPPLE DISCHARGE: ICD-10-CM

## 2025-06-12 DIAGNOSIS — R10.2 PELVIC PAIN: ICD-10-CM

## 2025-06-12 DIAGNOSIS — Z13.89 SCREENING FOR GENITOURINARY CONDITION: ICD-10-CM

## 2025-06-12 LAB
B-HCG UR QL: NEGATIVE
BILIRUB BLD-MCNC: NEGATIVE MG/DL
CLARITY, POC: CLEAR
COLOR UR: YELLOW
EXPIRATION DATE: NORMAL
GLUCOSE UR STRIP-MCNC: NEGATIVE MG/DL
INTERNAL NEGATIVE CONTROL: NEGATIVE
INTERNAL POSITIVE CONTROL: POSITIVE
KETONES UR QL: ABNORMAL
LEUKOCYTE EST, POC: NEGATIVE
Lab: NORMAL
NITRITE UR-MCNC: NEGATIVE MG/ML
PH UR: 5 [PH] (ref 5–8)
PROT UR STRIP-MCNC: ABNORMAL MG/DL
RBC # UR STRIP: ABNORMAL /UL
SP GR UR: 1 (ref 1–1.03)
UROBILINOGEN UR QL: NORMAL

## 2025-06-12 RX ORDER — PROMETHAZINE HYDROCHLORIDE 25 MG/1
25 TABLET ORAL EVERY 6 HOURS PRN
COMMUNITY
Start: 2025-06-05 | End: 2026-06-05

## 2025-06-12 NOTE — PROGRESS NOTES
Sepideh Joy is a 24 y.o. patient who presents for follow up of   Chief Complaint   Patient presents with    Follow-up     Nipple Dicharge        24-year-old established patient here for follow-up of galactorrhea and to discuss several other issues.  She was complaining of bilateral spontaneous breast discharge and was noted to have a greatly elevated prolactin.  She had a pituitary MRI that was normal.  She has seen endocrinology and they recently increased her dose of cabergoline.  In May her last prolactin was down to 173.  She still having some intermittent breast discharge.  She is complaining of some pain on her left side.  She thinks it is bowel related but wants to check on her ovaries.  Her ultrasound shows an 8.3 cm AV uterus.  Her IUD is in place in the endometrium.  Her ovaries are both normal.  Her ultrasound was compared to her CT scan on 9/8/2024.  She is also complaining of some vaginal discharge.  She is not sexually active, although she does have a boyfriend now.      The following portions of the patient's history were reviewed and updated as appropriate: allergies, current medications and problem list.    Review of Systems   Constitutional:  Positive for activity change and unexpected weight change (gain). Negative for appetite change, fatigue and fever.   Eyes:  Negative for photophobia and visual disturbance.   Respiratory:  Negative for cough and shortness of breath.    Cardiovascular:  Negative for chest pain and palpitations.   Gastrointestinal:  Positive for abdominal pain. Negative for abdominal distention, constipation, diarrhea and nausea.   Endocrine: Negative for cold intolerance and heat intolerance.   Genitourinary:  Positive for pelvic pain and vaginal discharge. Negative for difficulty urinating, dyspareunia, dysuria, menstrual problem and vaginal bleeding.   Musculoskeletal:  Negative for back pain.        B breast discahrge   Skin:  Negative for color change and rash.  "  Neurological:  Positive for headaches. Negative for weakness.   Hematological:  Negative for adenopathy. Does not bruise/bleed easily.   Psychiatric/Behavioral:  Negative for dysphoric mood. The patient is not nervous/anxious.        /88   Ht 157.5 cm (62.01\")   Wt 64.8 kg (142 lb 12.8 oz)   LMP  (LMP Unknown)   BMI 26.11 kg/m²     Physical Exam  Vitals and nursing note reviewed. Exam conducted with a chaperone present.   Constitutional:       Appearance: Normal appearance. She is well-developed.   HENT:      Head: Normocephalic and atraumatic.   Eyes:      General: No scleral icterus.     Conjunctiva/sclera: Conjunctivae normal.   Neck:      Thyroid: No thyromegaly.   Abdominal:      General: There is no distension.      Palpations: Abdomen is soft. There is no mass.      Tenderness: There is no abdominal tenderness. There is no guarding or rebound.      Hernia: No hernia is present.   Genitourinary:     General: Normal vulva.      Vagina: Vaginal discharge present.   Skin:     General: Skin is warm and dry.   Neurological:      Mental Status: She is alert and oriented to person, place, and time.   Psychiatric:         Behavior: Behavior normal.         Thought Content: Thought content normal.         Judgment: Judgment normal.         A/P:  1. Galactorrhea- pt seeing endocrinology and is on cabergoline.  Bilateral breast ultrasound in 3/2025 was B1.  Recent prolactin has lowered but is still abnormal.  Continue follow-up with endocrinology  2.  Vaginal discharge-check swab Y/M/B/L  3.  Pelvic pain-normal pelvic ultrasound and IUD in place.  4.  RHM-RTO 1/2026 for annual and/or as needed  5. EPIC LOS calculator used to determine coding level.       Assessment & Plan   Diagnoses and all orders for this visit:    1. Vaginal discharge (Primary)  -     NuSwab VG+ - Swab, Vagina  -     Genital Mycoplasmas KRISTIN, Swab - Swab, Vagina    2. Screening for genitourinary condition  -     POC Urinalysis Dipstick  -   "   POC Pregnancy, Urine    3. Nipple discharge  -     POC Urinalysis Dipstick  -     POC Pregnancy, Urine    4. Pelvic pain                 No follow-ups on file.      Melany Murray MD    6/12/2025  13:10 EDT

## 2025-06-17 LAB
A VAGINAE DNA VAG QL NAA+PROBE: NORMAL SCORE
BVAB2 DNA VAG QL NAA+PROBE: NORMAL SCORE
C ALBICANS DNA VAG QL NAA+PROBE: NEGATIVE
C GLABRATA DNA VAG QL NAA+PROBE: NEGATIVE
C TRACH DNA SPEC QL NAA+PROBE: NEGATIVE
M GENITALIUM DNA SPEC QL NAA+PROBE: NEGATIVE
M HOMINIS DNA SPEC QL NAA+PROBE: NEGATIVE
MEGA1 DNA VAG QL NAA+PROBE: NORMAL SCORE
N GONORRHOEA DNA VAG QL NAA+PROBE: NEGATIVE
T VAGINALIS DNA VAG QL NAA+PROBE: NEGATIVE
UREAPLASMA DNA SPEC QL NAA+PROBE: POSITIVE

## 2025-06-19 RX ORDER — DOXYCYCLINE HYCLATE 100 MG
100 TABLET ORAL 2 TIMES DAILY
Qty: 14 TABLET | Refills: 0 | Status: SHIPPED | OUTPATIENT
Start: 2025-06-19 | End: 2025-06-26

## 2025-06-24 ENCOUNTER — TELEPHONE (OUTPATIENT)
Dept: OBSTETRICS AND GYNECOLOGY | Facility: CLINIC | Age: 25
End: 2025-06-24
Payer: MEDICARE

## 2025-06-25 RX ORDER — AZITHROMYCIN 250 MG/1
TABLET, FILM COATED ORAL
Qty: 6 TABLET | Refills: 0 | Status: SHIPPED | OUTPATIENT
Start: 2025-06-25

## 2025-07-16 ENCOUNTER — OFFICE VISIT (OUTPATIENT)
Dept: OBSTETRICS AND GYNECOLOGY | Facility: CLINIC | Age: 25
End: 2025-07-16
Payer: MEDICARE

## 2025-07-16 VITALS
DIASTOLIC BLOOD PRESSURE: 64 MMHG | WEIGHT: 139.2 LBS | HEIGHT: 62 IN | SYSTOLIC BLOOD PRESSURE: 116 MMHG | BODY MASS INDEX: 25.62 KG/M2

## 2025-07-16 DIAGNOSIS — N89.8 VAGINAL DISCHARGE: Primary | ICD-10-CM

## 2025-07-16 DIAGNOSIS — R10.2 PELVIC PAIN: ICD-10-CM

## 2025-07-16 LAB
B-HCG UR QL: NEGATIVE
BILIRUB BLD-MCNC: NEGATIVE MG/DL
CLARITY, POC: CLEAR
COLOR UR: YELLOW
EXPIRATION DATE: NORMAL
GLUCOSE UR STRIP-MCNC: NEGATIVE MG/DL
INTERNAL NEGATIVE CONTROL: NEGATIVE
INTERNAL POSITIVE CONTROL: POSITIVE
KETONES UR QL: NEGATIVE
LEUKOCYTE EST, POC: NEGATIVE
Lab: NORMAL
NITRITE UR-MCNC: NEGATIVE MG/ML
PH UR: 5 [PH] (ref 5–8)
PROT UR STRIP-MCNC: ABNORMAL MG/DL
RBC # UR STRIP: NEGATIVE /UL
SP GR UR: 1 (ref 1–1.03)
UROBILINOGEN UR QL: NORMAL

## 2025-07-16 NOTE — PROGRESS NOTES
"Subjective     Chief Complaint   Patient presents with    Vaginal Discharge       Sepideh Joy is a 24 y.o.  whose LMP is Patient's last menstrual period was 2025.. This patient is new to me- yes, but is an established patient of this practice.  She presents with pelvic pain with discharge    HPI    Treated for ureaplasma last month; given Doxy but caused upset stomach, so didn't finish rest of med.  Abx switched to Z-pack.  Feels like symptoms improved after antibiotic.  Still have pelvic pressure with vaginal itching/odor. She self caths; unsure if symptoms are r/t UTI.  Pelvic US on 25 showed IUD in place.         The following portions of the patient's history were reviewed and updated as appropriate:vital signs, allergies, current medications, past medical history, past social history, past surgical history, and problem list      Review of Systems     Review of Systems   Genitourinary:  Positive for pelvic pain and vaginal discharge. Negative for dysuria, menstrual problem and vaginal pain.       Objective      /64   Ht 157.5 cm (62.01\")   Wt 63.1 kg (139 lb 3.2 oz)   LMP 2025 Comment: IUD  BMI 25.45 kg/m²     Physical Exam    Physical Exam  Vitals reviewed.   Constitutional:       General: She is awake. She is not in acute distress.     Appearance: She is not ill-appearing.   Eyes:      Conjunctiva/sclera: Conjunctivae normal.   Pulmonary:      Effort: Pulmonary effort is normal. No respiratory distress.   Genitourinary:     General: Normal vulva.      Exam position: Lithotomy position.      Labia:         Right: No rash or lesion.         Left: No rash or lesion.       Vagina: Vaginal discharge (brown) present. No erythema, tenderness, bleeding or lesions.      Uterus: Normal.       Adnexa: Right adnexa normal and left adnexa normal.      Comments: Unable to see strings due to discomfort and brown discharge  Musculoskeletal:      Cervical back: Neck supple. No rigidity. "   Lymphadenopathy:      Lower Body: No right inguinal adenopathy. No left inguinal adenopathy.   Skin:     General: Skin is warm and dry.      Capillary Refill: Capillary refill takes less than 2 seconds.   Neurological:      Mental Status: She is alert and oriented to person, place, and time.   Psychiatric:         Mood and Affect: Mood and affect normal.         Behavior: Behavior normal.           Lab Review   Labs: Urine pregnancy test, Urinalysis - with micro     Imaging: No data reviewed      Assessment/Plan  Diagnoses and all orders for this visit:    1. Vaginal discharge (Primary)  -     POC Urinalysis Dipstick  -     POC Pregnancy, Urine  -     NuSwab VG+ - Swab, Vagina  -     Genital Mycoplasmas KRISTIN, Swab - Swab, Vagina    2. Pelvic pain  -     Urine Culture - Urine, Urine, Random Void  -     NuSwab VG+ - Swab, Vagina  -     Genital Mycoplasmas KRISTIN, Swab - Swab, Vagina    Repeat vaginal swab; check urine cx. Treatment pending results    Return if symptoms worsen or fail to improve.    EPIC LOS calculator used to determine coding level     Latrice Brooks, SUZAN  7/16/2025  14:04 EDT

## 2025-07-18 LAB
A VAGINAE DNA VAG QL NAA+PROBE: NORMAL SCORE
BACTERIA UR CULT: NO GROWTH
BACTERIA UR CULT: NORMAL
BVAB2 DNA VAG QL NAA+PROBE: NORMAL SCORE
C ALBICANS DNA VAG QL NAA+PROBE: NEGATIVE
C GLABRATA DNA VAG QL NAA+PROBE: NEGATIVE
C TRACH DNA SPEC QL NAA+PROBE: NEGATIVE
M GENITALIUM DNA SPEC QL NAA+PROBE: NEGATIVE
M HOMINIS DNA SPEC QL NAA+PROBE: NEGATIVE
MEGA1 DNA VAG QL NAA+PROBE: NORMAL SCORE
N GONORRHOEA DNA VAG QL NAA+PROBE: NEGATIVE
T VAGINALIS DNA VAG QL NAA+PROBE: NEGATIVE
UREAPLASMA DNA SPEC QL NAA+PROBE: NEGATIVE

## (undated) DEVICE — GLV SURG NEOPRN SENSICARE PF SZ/6.5 LF EA/1PR

## (undated) DEVICE — LAG PERI GYN: Brand: MEDLINE INDUSTRIES, INC.